# Patient Record
Sex: MALE | Race: WHITE | Employment: FULL TIME | URBAN - METROPOLITAN AREA
[De-identification: names, ages, dates, MRNs, and addresses within clinical notes are randomized per-mention and may not be internally consistent; named-entity substitution may affect disease eponyms.]

---

## 2017-06-08 NOTE — LETTER
12/14/22        [address] [place] [id number]      Dear Cecile Panda records indicate that you have outstanding lab work and or testing that was ordered for you and has not yet been completed:  Orders Placed This Encounter      Occult Blood, Fecal, Immunoassay (Blue cards) [E]    To provide you with the best possible care, please complete these orders at your earliest convenience. If you have recently completed these orders please disregard this letter. If you have any questions please call the office at Dept: [phone].      Thank you,       Hyacinth Gates PA-C Statement Selected

## 2020-04-12 ENCOUNTER — APPOINTMENT (OUTPATIENT)
Dept: CT IMAGING | Facility: HOSPITAL | Age: 55
End: 2020-04-12
Attending: EMERGENCY MEDICINE
Payer: COMMERCIAL

## 2020-04-12 ENCOUNTER — APPOINTMENT (OUTPATIENT)
Dept: ULTRASOUND IMAGING | Facility: HOSPITAL | Age: 55
End: 2020-04-12
Attending: EMERGENCY MEDICINE
Payer: COMMERCIAL

## 2020-04-12 ENCOUNTER — HOSPITAL ENCOUNTER (OUTPATIENT)
Facility: HOSPITAL | Age: 55
Setting detail: OBSERVATION
Discharge: HOME | End: 2020-04-16
Attending: EMERGENCY MEDICINE | Admitting: HOSPITALIST
Payer: COMMERCIAL

## 2020-04-12 DIAGNOSIS — R10.9 ABDOMINAL PAIN, ACUTE: Primary | ICD-10-CM

## 2020-04-12 DIAGNOSIS — K80.20 CALCULUS OF GALLBLADDER WITHOUT CHOLECYSTITIS WITHOUT OBSTRUCTION: ICD-10-CM

## 2020-04-12 DIAGNOSIS — R74.8 ELEVATED LIVER ENZYMES: ICD-10-CM

## 2020-04-12 PROCEDURE — 80048 BASIC METABOLIC PNL TOTAL CA: CPT | Performed by: EMERGENCY MEDICINE

## 2020-04-12 PROCEDURE — 99285 EMERGENCY DEPT VISIT HI MDM: CPT

## 2020-04-12 PROCEDURE — 96374 THER/PROPH/DIAG INJ IV PUSH: CPT

## 2020-04-12 PROCEDURE — 86256 FLUORESCENT ANTIBODY TITER: CPT | Performed by: INTERNAL MEDICINE

## 2020-04-12 PROCEDURE — 71275 CT ANGIOGRAPHY CHEST: CPT | Performed by: EMERGENCY MEDICINE

## 2020-04-12 PROCEDURE — 82728 ASSAY OF FERRITIN: CPT | Performed by: INTERNAL MEDICINE

## 2020-04-12 PROCEDURE — 74175 CTA ABDOMEN W/CONTRAST: CPT | Performed by: EMERGENCY MEDICINE

## 2020-04-12 PROCEDURE — 86255 FLUORESCENT ANTIBODY SCREEN: CPT | Performed by: INTERNAL MEDICINE

## 2020-04-12 PROCEDURE — 76705 ECHO EXAM OF ABDOMEN: CPT | Performed by: EMERGENCY MEDICINE

## 2020-04-12 PROCEDURE — 81003 URINALYSIS AUTO W/O SCOPE: CPT | Performed by: EMERGENCY MEDICINE

## 2020-04-12 PROCEDURE — 80074 ACUTE HEPATITIS PANEL: CPT | Performed by: INTERNAL MEDICINE

## 2020-04-12 PROCEDURE — 85025 COMPLETE CBC W/AUTO DIFF WBC: CPT | Performed by: EMERGENCY MEDICINE

## 2020-04-12 PROCEDURE — 96361 HYDRATE IV INFUSION ADD-ON: CPT

## 2020-04-12 PROCEDURE — 87040 BLOOD CULTURE FOR BACTERIA: CPT | Performed by: HOSPITALIST

## 2020-04-12 PROCEDURE — 74176 CT ABD & PELVIS W/O CONTRAST: CPT | Performed by: EMERGENCY MEDICINE

## 2020-04-12 PROCEDURE — 81001 URINALYSIS AUTO W/SCOPE: CPT | Performed by: EMERGENCY MEDICINE

## 2020-04-12 PROCEDURE — 83540 ASSAY OF IRON: CPT | Performed by: INTERNAL MEDICINE

## 2020-04-12 PROCEDURE — 86038 ANTINUCLEAR ANTIBODIES: CPT | Performed by: INTERNAL MEDICINE

## 2020-04-12 PROCEDURE — 84466 ASSAY OF TRANSFERRIN: CPT | Performed by: INTERNAL MEDICINE

## 2020-04-12 PROCEDURE — 80076 HEPATIC FUNCTION PANEL: CPT | Performed by: EMERGENCY MEDICINE

## 2020-04-12 PROCEDURE — 83690 ASSAY OF LIPASE: CPT | Performed by: HOSPITALIST

## 2020-04-12 PROCEDURE — 86140 C-REACTIVE PROTEIN: CPT | Performed by: HOSPITALIST

## 2020-04-12 PROCEDURE — 84145 PROCALCITONIN (PCT): CPT | Performed by: HOSPITALIST

## 2020-04-12 PROCEDURE — 96375 TX/PRO/DX INJ NEW DRUG ADDON: CPT

## 2020-04-12 RX ORDER — MORPHINE SULFATE 4 MG/ML
4 INJECTION, SOLUTION INTRAMUSCULAR; INTRAVENOUS ONCE
Status: COMPLETED | OUTPATIENT
Start: 2020-04-12 | End: 2020-04-12

## 2020-04-12 RX ORDER — TRAMADOL HYDROCHLORIDE 50 MG/1
50 TABLET ORAL EVERY 6 HOURS PRN
Status: DISCONTINUED | OUTPATIENT
Start: 2020-04-12 | End: 2020-04-16

## 2020-04-12 RX ORDER — ONDANSETRON 2 MG/ML
4 INJECTION INTRAMUSCULAR; INTRAVENOUS ONCE
Status: COMPLETED | OUTPATIENT
Start: 2020-04-12 | End: 2020-04-12

## 2020-04-12 RX ORDER — ONDANSETRON 2 MG/ML
4 INJECTION INTRAMUSCULAR; INTRAVENOUS EVERY 6 HOURS PRN
Status: DISCONTINUED | OUTPATIENT
Start: 2020-04-12 | End: 2020-04-16

## 2020-04-12 RX ORDER — SODIUM CHLORIDE 9 MG/ML
INJECTION, SOLUTION INTRAVENOUS CONTINUOUS
Status: DISCONTINUED | OUTPATIENT
Start: 2020-04-12 | End: 2020-04-12

## 2020-04-12 RX ORDER — ONDANSETRON 2 MG/ML
INJECTION INTRAMUSCULAR; INTRAVENOUS
Status: COMPLETED
Start: 2020-04-12 | End: 2020-04-12

## 2020-04-12 RX ORDER — KETOROLAC TROMETHAMINE 15 MG/ML
15 INJECTION, SOLUTION INTRAMUSCULAR; INTRAVENOUS ONCE
Status: COMPLETED | OUTPATIENT
Start: 2020-04-12 | End: 2020-04-12

## 2020-04-12 RX ORDER — ACETAMINOPHEN 325 MG/1
650 TABLET ORAL EVERY 6 HOURS PRN
Status: DISCONTINUED | OUTPATIENT
Start: 2020-04-12 | End: 2020-04-16

## 2020-04-12 RX ORDER — SODIUM CHLORIDE 9 MG/ML
INJECTION, SOLUTION INTRAVENOUS CONTINUOUS
Status: DISCONTINUED | OUTPATIENT
Start: 2020-04-12 | End: 2020-04-16

## 2020-04-12 RX ORDER — FAMOTIDINE 20 MG/1
20 TABLET ORAL 2 TIMES DAILY
Status: DISCONTINUED | OUTPATIENT
Start: 2020-04-12 | End: 2020-04-16

## 2020-04-12 RX ORDER — SODIUM CHLORIDE 0.9 % (FLUSH) 0.9 %
3 SYRINGE (ML) INJECTION AS NEEDED
Status: DISCONTINUED | OUTPATIENT
Start: 2020-04-12 | End: 2020-04-16

## 2020-04-12 RX ORDER — KETOROLAC TROMETHAMINE 30 MG/ML
30 INJECTION, SOLUTION INTRAMUSCULAR; INTRAVENOUS EVERY 6 HOURS PRN
Status: DISCONTINUED | OUTPATIENT
Start: 2020-04-12 | End: 2020-04-14

## 2020-04-12 NOTE — PROGRESS NOTES
[facility] Pharmacy Note: Antimicrobial Weight Based Dose Adjustment for: piperacillin/tazobactam (Penny Marcelo)    Rosa Brantley is a 62year old male who has been prescribed piperacillin/tazobactam (ZOSYN) 3.375 gm every 8 hours.     Estimated Creatinine Clearance: 95.3

## 2020-04-12 NOTE — CONSULTS
[facility]    Report of Consultation    Tricia Fletcher Patient Status:  Emergency    1965 MRN [id number]   Location [address] Attending Rowan Bay MD   Hosp Day # 0 PCP Reginaldo Bowers MD     Da Oral, resp. rate 20, height 6' (1.829 m), weight 255 lb (115.7 kg), SpO2 97 %.     GENERAL: well developed, well nourished, in no apparent distress  SKIN: no rashes, no suspicious lesions  HEENT: atraumatic, normocephalic  CHEST/CARDIO: RRR without murmur Dictated by (CST): Lorena Reyes MD on 4/12/2020 at 9:59 AM     Finalized by (CST): Lorena Reyes MD on 4/12/2020 at 10:06 AM               Impression:   62year old male with Obesity who presents with acute onset of abdominal pain.     Abdominal Pa

## 2020-04-12 NOTE — PROGRESS NOTES
Admitted from ED. Settled into bed, educated on call light use. IVF, IV zosyn initiated. Temperature 103 degrees upon arrival. Denied chills. Dr. Leeann Hill MD notified at bedside. Swabbed for COVID testing. Tylenol given and ice packs given.  Rechecked temp--> 1

## 2020-04-12 NOTE — H&P
[facility] Hospitalist H&P       CC: Patient presents with:  Abdomen/Flank Pain       PCP: Trish Castillo MD    History of Present Illness: Patient is a 62year old male with no PMH who presents with abd pain.   Patient states this morning a 4 am he woke up Diagnostic Data:    CBC/Chem  Recent Labs   Lab 04/12/20  0831   WBC 9.0   HGB 17.1   MCV 94.0   .0       Recent Labs   Lab 04/12/20  0831      K 3.9      CO2 27.0   BUN 14   CREATSERUM 0.95   *   CA 9.3       Recent Labs   La negative  - GI consulted, discussed with Dr. Newberry Revebk  - plan for MRCP  - given fever (103 while on floor), will start zosyn    Fever  - CT without obvious infectious etiology, poss cholangitis?  But no dilated ducts  - check BC and UA  - rapid COVID ordered,

## 2020-04-12 NOTE — ED PROVIDER NOTES
Patient Seen in: [facility] AND [facility] Emergency Department      History   Patient presents with:  Abdomen/Flank Pain    Stated Complaint: ABDOMINAL PAIN    HPI    The patient is a 78-year-old male who presents with sudden onset of sharp abdominal pain that regular rhythm. Pulses: Normal pulses. Heart sounds: Normal heart sounds. No murmur. Pulmonary:      Effort: Pulmonary effort is normal.      Breath sounds: Normal breath sounds.    Abdominal:      General: Bowel sounds are normal. There is no d CBC W/ DIFFERENTIAL[[id number]]                              Final result                 Please view results for these tests on the individual orders.    RAINBOW DRAW BLUE   RAINBOW DRAW LAVENDER   RAINBOW DRAW LIGHT GREEN   RAINBOW DRAW GOLD   CBC W/ DIF 4/12/2020 at 9:59 AM     Finalized by (CST): Lorena Reyes MD on 4/12/2020 at 10:06 AM            Radiology exams  Viewed and reviewed by myself and findings discussed with patient including need for follow up    Admission disposition: 4/12/2020 12:16

## 2020-04-12 NOTE — ED NOTES
Patient here with severe abd pain x4 hours. Patient has pain to the RLQ and across his mid abd. Mid abd firm to touch, slightly radiates to back. Episode of nausea in room, no vomiting. Placed on monitors, will continue to assess.

## 2020-04-13 ENCOUNTER — APPOINTMENT (OUTPATIENT)
Dept: MRI IMAGING | Facility: HOSPITAL | Age: 55
End: 2020-04-13
Attending: HOSPITALIST
Payer: COMMERCIAL

## 2020-04-13 PROCEDURE — 85610 PROTHROMBIN TIME: CPT | Performed by: HOSPITALIST

## 2020-04-13 PROCEDURE — 76376 3D RENDER W/INTRP POSTPROCES: CPT | Performed by: HOSPITALIST

## 2020-04-13 PROCEDURE — 74181 MRI ABDOMEN W/O CONTRAST: CPT | Performed by: HOSPITALIST

## 2020-04-13 PROCEDURE — 82728 ASSAY OF FERRITIN: CPT | Performed by: HOSPITALIST

## 2020-04-13 PROCEDURE — 83735 ASSAY OF MAGNESIUM: CPT | Performed by: HOSPITALIST

## 2020-04-13 PROCEDURE — 80053 COMPREHEN METABOLIC PANEL: CPT | Performed by: HOSPITALIST

## 2020-04-13 PROCEDURE — 85025 COMPLETE CBC W/AUTO DIFF WBC: CPT | Performed by: HOSPITALIST

## 2020-04-13 NOTE — PLAN OF CARE
Problem: Patient Centered Care  Goal: Patient preferences are identified and integrated in the patient's plan of care  Description  Interventions:  - What would you like us to know as we care for you?   - Provide timely, complete, and accurate informatio profile  Outcome: Progressing     Problem: PAIN - ADULT  Goal: Verbalizes/displays adequate comfort level or patient's stated pain goal  Description  INTERVENTIONS:  - Encourage pt to monitor pain and request assistance  - Assess pain using appropriate polo POLST form as appropriate  - Assess patient's ability to be responsible for managing their own health  - Refer to Case Management Department for coordinating discharge planning if the patient needs post-hospital services based on physician/LIP order or com

## 2020-04-13 NOTE — PLAN OF CARE
Discussed plan of care for day, including all given medications and their indications. Patient rule-out Covid-19 since admission yesterday evening. Rapid screen and PCR screen both negative. NPO at present.   Plan for MRCP tonight -- based on results, wi intermittent suction as ordered  - Evaluate effectiveness of ordered antiemetic medications  - Provide nonpharmacologic comfort measures as appropriate  - Advance diet as tolerated, if ordered  - Obtain nutritional consult as needed  - Evaluate fluid qing Provide assistive devices as appropriate  - Consider OT/PT consult to assist with strengthening/mobility  - Encourage toileting schedule  Outcome: Progressing     Problem: DISCHARGE PLANNING  Goal: Discharge to home or other facility with appropriate resou

## 2020-04-13 NOTE — PROGRESS NOTES
Patient spiked fever to 103 during afternoon of admission. Zosyn empirically starterd. MRCP recommended and is pending. Rapid Covid-19 negative. LFTs worsening with Tbili now up to 4.3 this AM.  Need MRCP and possible ERCP today.  Will discuss with

## 2020-04-13 NOTE — PROGRESS NOTES
[facility] Hospitalist Progress Note     PCP: Kylie Hampton MD    CC: Follow up       Assessment/Plan:     Patient is a 62year old male with no PMH who presents with abd pain.      Abd pain / transaminitis   - severe abd pain on admit, LFT's elevated  - C kg)  04/19/16 1523 : 235 lb (106.6 kg)      Exam  Gen: No acute distress, alert and oriented x3  Neck Supple, no JVD  Pulm: Lungs clear, normal respiratory effort, No wheezing or crackles  CV: Heart with regular rate and rhythm, No murmurs, rubs, gallops hernia which also contains small portion of the descending distal colon. No evidence of bowel obstruction. Hepatic steatosis. Cholelithiasis. Colonic diverticulosis. Prominent prostate.     Dictated by (CST): Dennise Salmeron MD on 4/12/2020 at 9:24 A

## 2020-04-13 NOTE — CONSULTS
[address] [facility] [address]  GIS:[id number]  LOS:0    Date of Admission:  4/12/2020  Date of Consult:  4/13/2020     Blair Bennett MBP/ADD-vantage, 4.5 g, Intravenous, Q8H  •  famoTIDine (PEPCID) tab 20 mg, 20 mg, Oral, BID    Review of Systems:   Pertinent items are noted in HPI.       Physical Exam:    04/12/20  1908 04/12/20  2023 04/12/20  2259 04/13/20  0628   BP:  124/87  134/90 examination. Small fat containing left inguinal hernia which also contains small portion of the descending distal colon. No evidence of bowel obstruction. Hepatic steatosis. Cholelithiasis. Colonic diverticulosis. Prominent prostate.     Dictated by (

## 2020-04-14 ENCOUNTER — APPOINTMENT (OUTPATIENT)
Dept: ULTRASOUND IMAGING | Facility: HOSPITAL | Age: 55
End: 2020-04-14
Attending: HOSPITALIST
Payer: COMMERCIAL

## 2020-04-14 PROCEDURE — 83690 ASSAY OF LIPASE: CPT | Performed by: SURGERY

## 2020-04-14 PROCEDURE — 85025 COMPLETE CBC W/AUTO DIFF WBC: CPT | Performed by: SURGERY

## 2020-04-14 PROCEDURE — 85610 PROTHROMBIN TIME: CPT | Performed by: SURGERY

## 2020-04-14 PROCEDURE — 93010 ELECTROCARDIOGRAM REPORT: CPT | Performed by: HOSPITALIST

## 2020-04-14 PROCEDURE — 85730 THROMBOPLASTIN TIME PARTIAL: CPT | Performed by: SURGERY

## 2020-04-14 PROCEDURE — 84100 ASSAY OF PHOSPHORUS: CPT | Performed by: HOSPITALIST

## 2020-04-14 PROCEDURE — 85379 FIBRIN DEGRADATION QUANT: CPT | Performed by: SURGERY

## 2020-04-14 PROCEDURE — 83735 ASSAY OF MAGNESIUM: CPT | Performed by: SURGERY

## 2020-04-14 PROCEDURE — 93970 EXTREMITY STUDY: CPT | Performed by: HOSPITALIST

## 2020-04-14 PROCEDURE — 84100 ASSAY OF PHOSPHORUS: CPT | Performed by: SURGERY

## 2020-04-14 PROCEDURE — 93005 ELECTROCARDIOGRAM TRACING: CPT

## 2020-04-14 PROCEDURE — 80048 BASIC METABOLIC PNL TOTAL CA: CPT | Performed by: SURGERY

## 2020-04-14 PROCEDURE — 80076 HEPATIC FUNCTION PANEL: CPT | Performed by: SURGERY

## 2020-04-14 RX ORDER — ENOXAPARIN SODIUM 100 MG/ML
40 INJECTION SUBCUTANEOUS ONCE
Status: COMPLETED | OUTPATIENT
Start: 2020-04-14 | End: 2020-04-14

## 2020-04-14 NOTE — PLAN OF CARE
Problem: Patient Centered Care  Goal: Patient preferences are identified and integrated in the patient's plan of care  Description  Interventions:  - What would you like us to know as we care for you?   I work for a [address] [place], [place] [address]melissa Watson for [address] ordered and tolerated  - Evaluate effectiveness of GI medications  - Encourage mobilization and activity  - Obtain nutritional consult as needed  - Establish a toileting routine/schedule  - Consider collaborating with pharmacy to review patient's medicatio discharge resources and transportation as appropriate  - Identify discharge learning needs (meds, wound care, etc)  - Arrange for interpreters to assist at discharge as needed  - Consider post-discharge preferences of patient/family/discharge partner  - Co

## 2020-04-14 NOTE — PROGRESS NOTES
[facility]    General Surgery Progress Note  Samantha Gilliam  TYP:[id number]  HD# [id number]       Subjective:   Denies complaints.  denies abdominal pain, nausea and vomiting  Passing flatus      Exam:     General: awake and alert, in no acute distress, 4/13/2020  CONCLUSION:  1. The pancreaticobiliary tree demonstrates normal caliber and morphology without identification of intraductal filling defects. 2. Cholelithiasis without MR evidence of acute cholecystitis or choledocholithiasis.   3. There are a f

## 2020-04-14 NOTE — PLAN OF CARE
Pt is currently resting. C/o headache earlier, gave tylenol PRN. Voiding within normal limits. NPO at midnight maintained. Isolation precautions maintained. IVF infusing. Safety plan in place. Plan is ongoing.      Problem: Patient Centered Care  Goal: Ysabel needed  - Evaluate fluid balance  Outcome: Progressing  Goal: Maintains or returns to baseline bowel function  Description  INTERVENTIONS:  - Assess bowel function  - Maintain adequate hydration with IV or PO as ordered and tolerated  - Evaluate effectiven facility with appropriate resources  Description  INTERVENTIONS:  - Identify barriers to discharge w/pt and caregiver  - Include patient/family/discharge partner in discharge planning  - Arrange for needed discharge resources and transportation as appropri

## 2020-04-14 NOTE — PROGRESS NOTES
S-  made pre-op visit. Patient alert in room  O- patient expressed feeling frustrated at set-backs in procedure. Patient    is anxious to return home and return to work. Patient expressed having   anxieties about finances.  Patient is trying to take

## 2020-04-14 NOTE — PROGRESS NOTES
[facility] Hospitalist Progress Note     PCP: Trish Castillo MD    CC: Follow up       Assessment/Plan:     Patient is a 62year old male with no PMH who presents with abd pain. MRCP with cholelithiasis and no retained stones.   Few cystic foci in pancreas t recommendations pending patient's clinical course.   [facility] hospitalist to continue to follow patient while in house     Patient and/or patient's family given opportunity to ask questions and note understanding and agreeing with therapeutic plan as outlined    INR  --  1.39*  --  1.23*       Recent Labs   Lab 04/12/20  0831 04/13/20  0506 04/14/20  0513    140 141   K 3.9 3.5 4.4    110 113*   CO2 27.0 23.0 25.0   BUN 14 10 5*   CREATSERUM 0.95 0.90 0.86   CA 9.3 7.8* 8.3*   MG  --  2.2 2.5   PHOS colon. No evidence of bowel obstruction. Hepatic steatosis. Cholelithiasis. Colonic diverticulosis. Prominent prostate.     Dictated by (CST): Kasandra Ross MD on 4/12/2020 at 9:24 AM     Finalized by (CST): Kasandra Ross MD on 4/12/2020 at 9:3

## 2020-04-14 NOTE — PROGRESS NOTES
[facility] Gastroenterology Progress Note    Miryam Gavin  [id number]  4/21/1965    No ref.  provider found    24 hour events   -MRCP without any choledocholithiasis   -denies any abdominal pain     MEDICAL HISTORY:     History revi 126 (H) 04/14/2020 05:13 AM    ANIONGAP 3 04/14/2020 05:13 AM      Lab Results   Component Value Date/Time    MG 2.5 04/14/2020 05:13 AM    PHOS 1.4 (L) 04/14/2020 05:13 AM     Lab Results   Component Value Date/Time     (H) 04/14/2020 05:13 AM    A CONCLUSION: Normal examination.      Dictated by (CST): Raquel Harris MD on 4/14/2020 at 1:19 PM     Finalized by (CST): Raquel Harris MD on 4/14/2020 at 1:21 PM          Mri Mrcp W/3d Only (cpt=76376/64566)    Result Date: 4/13/2020  PROCEDURE: MRI M within the parameters of this exam. There is otherwise preservation of normal  signal intensity on precontrast T1-weighted, fat-saturated images. No focal solid-appearing mass or main pancreatic duct dilatation is seen.  There is no evidence of pancreatitis COMPARISON: None. INDICATIONS: Pt presents to Ed with acute abdominal RLQ pain. onset 4 hr ago  TECHNIQUE: Axial images of the abdomen and pelvis were obtained without intravenous contrast.  Multiplanar reformations obtained.   Dose reduction techniques uti at 9:30 AM          Cta Chest+cta Abdomen Dissect Set (ZJT=53036/41020)    Result Date: 4/12/2020  PROCEDURE: CT CHEST ABDOMEN DISSECT SET (CPT=71275/73409)  COMPARISON: [facility], CT ABDOMEN + PELVIS KIDNEYSTONE 2D RNDR (NO IV NO ORAL) (C dissection. Lesser incidental findings as above.     Dictated by (CST): Delaney Sanches MD on 4/12/2020 at 9:59 AM     Finalized by (CST): Delaney Sanches MD on 4/12/2020 at 10:06 AM               ASSESSMENT AND PLAN:   60yo M who presents with abdomin

## 2020-04-15 ENCOUNTER — APPOINTMENT (OUTPATIENT)
Dept: GENERAL RADIOLOGY | Facility: HOSPITAL | Age: 55
End: 2020-04-15
Attending: SURGERY
Payer: COMMERCIAL

## 2020-04-15 ENCOUNTER — ANESTHESIA (OUTPATIENT)
Dept: SURGERY | Facility: HOSPITAL | Age: 55
End: 2020-04-15
Payer: COMMERCIAL

## 2020-04-15 ENCOUNTER — ANESTHESIA EVENT (OUTPATIENT)
Dept: SURGERY | Facility: HOSPITAL | Age: 55
End: 2020-04-15
Payer: COMMERCIAL

## 2020-04-15 PROCEDURE — 83735 ASSAY OF MAGNESIUM: CPT | Performed by: SURGERY

## 2020-04-15 PROCEDURE — 87186 SC STD MICRODIL/AGAR DIL: CPT | Performed by: SURGERY

## 2020-04-15 PROCEDURE — 85025 COMPLETE CBC W/AUTO DIFF WBC: CPT | Performed by: SURGERY

## 2020-04-15 PROCEDURE — 84100 ASSAY OF PHOSPHORUS: CPT | Performed by: SURGERY

## 2020-04-15 PROCEDURE — 80076 HEPATIC FUNCTION PANEL: CPT | Performed by: SURGERY

## 2020-04-15 PROCEDURE — 87077 CULTURE AEROBIC IDENTIFY: CPT | Performed by: SURGERY

## 2020-04-15 PROCEDURE — 87205 SMEAR GRAM STAIN: CPT | Performed by: SURGERY

## 2020-04-15 PROCEDURE — 0FT44ZZ RESECTION OF GALLBLADDER, PERCUTANEOUS ENDOSCOPIC APPROACH: ICD-10-PCS | Performed by: SURGERY

## 2020-04-15 PROCEDURE — BF121ZZ FLUOROSCOPY OF GALLBLADDER USING LOW OSMOLAR CONTRAST: ICD-10-PCS | Performed by: SURGERY

## 2020-04-15 PROCEDURE — 87070 CULTURE OTHR SPECIMN AEROBIC: CPT | Performed by: SURGERY

## 2020-04-15 PROCEDURE — 85730 THROMBOPLASTIN TIME PARTIAL: CPT | Performed by: SURGERY

## 2020-04-15 PROCEDURE — 85610 PROTHROMBIN TIME: CPT | Performed by: SURGERY

## 2020-04-15 PROCEDURE — 88304 TISSUE EXAM BY PATHOLOGIST: CPT | Performed by: SURGERY

## 2020-04-15 PROCEDURE — 80048 BASIC METABOLIC PNL TOTAL CA: CPT | Performed by: SURGERY

## 2020-04-15 PROCEDURE — 74300 X-RAY BILE DUCTS/PANCREAS: CPT | Performed by: SURGERY

## 2020-04-15 RX ORDER — HYDROMORPHONE HYDROCHLORIDE 1 MG/ML
0.6 INJECTION, SOLUTION INTRAMUSCULAR; INTRAVENOUS; SUBCUTANEOUS EVERY 5 MIN PRN
Status: DISCONTINUED | OUTPATIENT
Start: 2020-04-15 | End: 2020-04-15 | Stop reason: HOSPADM

## 2020-04-15 RX ORDER — POTASSIUM CHLORIDE 20 MEQ/1
40 TABLET, EXTENDED RELEASE ORAL ONCE
Status: DISCONTINUED | OUTPATIENT
Start: 2020-04-15 | End: 2020-04-16

## 2020-04-15 RX ORDER — DEXAMETHASONE SODIUM PHOSPHATE 4 MG/ML
VIAL (ML) INJECTION AS NEEDED
Status: DISCONTINUED | OUTPATIENT
Start: 2020-04-15 | End: 2020-04-15 | Stop reason: SURG

## 2020-04-15 RX ORDER — HYDROCODONE BITARTRATE AND ACETAMINOPHEN 5; 325 MG/1; MG/1
2 TABLET ORAL EVERY 4 HOURS PRN
Status: DISCONTINUED | OUTPATIENT
Start: 2020-04-15 | End: 2020-04-16

## 2020-04-15 RX ORDER — PROCHLORPERAZINE EDISYLATE 5 MG/ML
5 INJECTION INTRAMUSCULAR; INTRAVENOUS ONCE AS NEEDED
Status: DISCONTINUED | OUTPATIENT
Start: 2020-04-15 | End: 2020-04-15 | Stop reason: HOSPADM

## 2020-04-15 RX ORDER — HYDROMORPHONE HYDROCHLORIDE 1 MG/ML
0.4 INJECTION, SOLUTION INTRAMUSCULAR; INTRAVENOUS; SUBCUTANEOUS EVERY 2 HOUR PRN
Status: DISCONTINUED | OUTPATIENT
Start: 2020-04-15 | End: 2020-04-16

## 2020-04-15 RX ORDER — MORPHINE SULFATE 10 MG/ML
6 INJECTION, SOLUTION INTRAMUSCULAR; INTRAVENOUS EVERY 10 MIN PRN
Status: DISCONTINUED | OUTPATIENT
Start: 2020-04-15 | End: 2020-04-15 | Stop reason: HOSPADM

## 2020-04-15 RX ORDER — ONDANSETRON 2 MG/ML
INJECTION INTRAMUSCULAR; INTRAVENOUS AS NEEDED
Status: DISCONTINUED | OUTPATIENT
Start: 2020-04-15 | End: 2020-04-15 | Stop reason: SURG

## 2020-04-15 RX ORDER — ROCURONIUM BROMIDE 10 MG/ML
INJECTION, SOLUTION INTRAVENOUS AS NEEDED
Status: DISCONTINUED | OUTPATIENT
Start: 2020-04-15 | End: 2020-04-15 | Stop reason: SURG

## 2020-04-15 RX ORDER — BUPIVACAINE HYDROCHLORIDE AND EPINEPHRINE 2.5; 5 MG/ML; UG/ML
INJECTION, SOLUTION INFILTRATION; PERINEURAL AS NEEDED
Status: DISCONTINUED | OUTPATIENT
Start: 2020-04-15 | End: 2020-04-15 | Stop reason: HOSPADM

## 2020-04-15 RX ORDER — HALOPERIDOL 5 MG/ML
0.25 INJECTION INTRAMUSCULAR ONCE AS NEEDED
Status: DISCONTINUED | OUTPATIENT
Start: 2020-04-15 | End: 2020-04-15 | Stop reason: HOSPADM

## 2020-04-15 RX ORDER — ENOXAPARIN SODIUM 100 MG/ML
40 INJECTION SUBCUTANEOUS DAILY
Status: DISCONTINUED | OUTPATIENT
Start: 2020-04-15 | End: 2020-04-16

## 2020-04-15 RX ORDER — HYDROMORPHONE HYDROCHLORIDE 1 MG/ML
0.2 INJECTION, SOLUTION INTRAMUSCULAR; INTRAVENOUS; SUBCUTANEOUS EVERY 5 MIN PRN
Status: DISCONTINUED | OUTPATIENT
Start: 2020-04-15 | End: 2020-04-15 | Stop reason: HOSPADM

## 2020-04-15 RX ORDER — HYDROMORPHONE HYDROCHLORIDE 1 MG/ML
0.4 INJECTION, SOLUTION INTRAMUSCULAR; INTRAVENOUS; SUBCUTANEOUS EVERY 5 MIN PRN
Status: DISCONTINUED | OUTPATIENT
Start: 2020-04-15 | End: 2020-04-15 | Stop reason: HOSPADM

## 2020-04-15 RX ORDER — HYDROMORPHONE HYDROCHLORIDE 1 MG/ML
0.8 INJECTION, SOLUTION INTRAMUSCULAR; INTRAVENOUS; SUBCUTANEOUS EVERY 2 HOUR PRN
Status: DISCONTINUED | OUTPATIENT
Start: 2020-04-15 | End: 2020-04-16

## 2020-04-15 RX ORDER — HYDROCODONE BITARTRATE AND ACETAMINOPHEN 5; 325 MG/1; MG/1
1 TABLET ORAL AS NEEDED
Status: DISCONTINUED | OUTPATIENT
Start: 2020-04-15 | End: 2020-04-15 | Stop reason: HOSPADM

## 2020-04-15 RX ORDER — ONDANSETRON 2 MG/ML
4 INJECTION INTRAMUSCULAR; INTRAVENOUS ONCE AS NEEDED
Status: DISCONTINUED | OUTPATIENT
Start: 2020-04-15 | End: 2020-04-15 | Stop reason: HOSPADM

## 2020-04-15 RX ORDER — HEPARIN SODIUM 1000 [USP'U]/ML
INJECTION, SOLUTION INTRAVENOUS; SUBCUTANEOUS AS NEEDED
Status: DISCONTINUED | OUTPATIENT
Start: 2020-04-15 | End: 2020-04-15 | Stop reason: HOSPADM

## 2020-04-15 RX ORDER — NALOXONE HYDROCHLORIDE 0.4 MG/ML
80 INJECTION, SOLUTION INTRAMUSCULAR; INTRAVENOUS; SUBCUTANEOUS AS NEEDED
Status: DISCONTINUED | OUTPATIENT
Start: 2020-04-15 | End: 2020-04-15 | Stop reason: HOSPADM

## 2020-04-15 RX ORDER — HYDROCODONE BITARTRATE AND ACETAMINOPHEN 5; 325 MG/1; MG/1
2 TABLET ORAL AS NEEDED
Status: DISCONTINUED | OUTPATIENT
Start: 2020-04-15 | End: 2020-04-15 | Stop reason: HOSPADM

## 2020-04-15 RX ORDER — HYDROMORPHONE HYDROCHLORIDE 1 MG/ML
1.2 INJECTION, SOLUTION INTRAMUSCULAR; INTRAVENOUS; SUBCUTANEOUS EVERY 2 HOUR PRN
Status: DISCONTINUED | OUTPATIENT
Start: 2020-04-15 | End: 2020-04-15

## 2020-04-15 RX ORDER — HYDROCODONE BITARTRATE AND ACETAMINOPHEN 5; 325 MG/1; MG/1
1 TABLET ORAL EVERY 4 HOURS PRN
Status: DISCONTINUED | OUTPATIENT
Start: 2020-04-15 | End: 2020-04-16

## 2020-04-15 RX ORDER — LIDOCAINE HYDROCHLORIDE 10 MG/ML
INJECTION, SOLUTION EPIDURAL; INFILTRATION; INTRACAUDAL; PERINEURAL AS NEEDED
Status: DISCONTINUED | OUTPATIENT
Start: 2020-04-15 | End: 2020-04-15 | Stop reason: SURG

## 2020-04-15 RX ORDER — MORPHINE SULFATE 4 MG/ML
4 INJECTION, SOLUTION INTRAMUSCULAR; INTRAVENOUS EVERY 10 MIN PRN
Status: DISCONTINUED | OUTPATIENT
Start: 2020-04-15 | End: 2020-04-15 | Stop reason: HOSPADM

## 2020-04-15 RX ORDER — SODIUM CHLORIDE, SODIUM LACTATE, POTASSIUM CHLORIDE, CALCIUM CHLORIDE 600; 310; 30; 20 MG/100ML; MG/100ML; MG/100ML; MG/100ML
INJECTION, SOLUTION INTRAVENOUS CONTINUOUS
Status: DISCONTINUED | OUTPATIENT
Start: 2020-04-15 | End: 2020-04-15 | Stop reason: HOSPADM

## 2020-04-15 RX ORDER — MORPHINE SULFATE 4 MG/ML
2 INJECTION, SOLUTION INTRAMUSCULAR; INTRAVENOUS EVERY 10 MIN PRN
Status: DISCONTINUED | OUTPATIENT
Start: 2020-04-15 | End: 2020-04-15 | Stop reason: HOSPADM

## 2020-04-15 RX ORDER — SODIUM CHLORIDE, SODIUM LACTATE, POTASSIUM CHLORIDE, CALCIUM CHLORIDE 600; 310; 30; 20 MG/100ML; MG/100ML; MG/100ML; MG/100ML
INJECTION, SOLUTION INTRAVENOUS CONTINUOUS PRN
Status: DISCONTINUED | OUTPATIENT
Start: 2020-04-15 | End: 2020-04-15 | Stop reason: SURG

## 2020-04-15 RX ADMIN — DEXAMETHASONE SODIUM PHOSPHATE 4 MG: 4 MG/ML VIAL (ML) INJECTION at 11:16:00

## 2020-04-15 RX ADMIN — ONDANSETRON 4 MG: 2 INJECTION INTRAMUSCULAR; INTRAVENOUS at 11:16:00

## 2020-04-15 RX ADMIN — ROCURONIUM BROMIDE 10 MG: 10 INJECTION, SOLUTION INTRAVENOUS at 11:16:00

## 2020-04-15 RX ADMIN — SODIUM CHLORIDE, SODIUM LACTATE, POTASSIUM CHLORIDE, CALCIUM CHLORIDE: 600; 310; 30; 20 INJECTION, SOLUTION INTRAVENOUS at 11:12:00

## 2020-04-15 RX ADMIN — ROCURONIUM BROMIDE 40 MG: 10 INJECTION, SOLUTION INTRAVENOUS at 11:25:00

## 2020-04-15 RX ADMIN — ROCURONIUM BROMIDE 20 MG: 10 INJECTION, SOLUTION INTRAVENOUS at 11:54:00

## 2020-04-15 RX ADMIN — SODIUM CHLORIDE, SODIUM LACTATE, POTASSIUM CHLORIDE, CALCIUM CHLORIDE: 600; 310; 30; 20 INJECTION, SOLUTION INTRAVENOUS at 12:53:00

## 2020-04-15 RX ADMIN — LIDOCAINE HYDROCHLORIDE 50 MG: 10 INJECTION, SOLUTION EPIDURAL; INFILTRATION; INTRACAUDAL; PERINEURAL at 11:16:00

## 2020-04-15 NOTE — ANESTHESIA PREPROCEDURE EVALUATION
Anesthesia PreOp Note    HPI:     Darleen Paez is a 62year old male who presents for preoperative consultation requested by: Marissa Carlson MD    Date of Surgery: 4/12/2020 - 4/15/2020    Procedure(s):  LAPAROSCOPIC CHOLECYSTECTOMY  Indication: cholec Relation Age of Onset   • Gastro-Intestinal Disorder Father      Social History    Socioeconomic History      Marital status:       Spouse name: Not on file      Number of children: Not on file      Years of education: Not on file      Highest educ 04/15/2020    BUN 4 (L) 04/15/2020    CREATSERUM 0.88 04/15/2020     (H) 04/15/2020    CA 8.3 (L) 04/15/2020     Lab Results   Component Value Date    INR 1.02 04/15/2020       Vital Signs: Body mass index is 34.58 kg/m².    height is 1.829 m (6') a risks including possible dental damage if relevant, major complications, and any alternative forms of anesthetic management. All of the patient's questions were answered to the best of my ability. The patient desires the anesthetic management as planned.

## 2020-04-15 NOTE — PROGRESS NOTES
[facility] Hospitalist Progress Note     PCP: Erica Childs MD    CC: Follow up       Assessment/Plan:     Patient is a 62year old male with no PMH who presents with abd pain. MRCP with cholelithiasis and no retained stones.   Few cystic foci in pancreas t recommendations pending patient's clinical course.   [facility] hospitalist to continue to follow patient while in house     Patient and/or patient's family given opportunity to ask questions and note understanding and agreeing with therapeutic plan as outlined    MCV 94.0 93.5 94.5  --  92.9   .0 146.0* 146.0*  --  158.0   INR  --  1.39*  --  1.23* 1.02       Recent Labs   Lab 04/12/20  0831 04/13/20  0506 04/14/20  0513 04/14/20  1920 04/15/20  0520    140 141  --  141   K 3.9 3.5 4.4  --  3.7   CL dilated side branch radicles, side branch type intraductal papillary mucinous neoplasms, or, in the setting of prior pancreatitis, tiny pseudocysts. A follow-up MRCP study is suggested in 12 months. 4. Hepatomegaly with underlying hepatic steatosis. 5.  L

## 2020-04-15 NOTE — PLAN OF CARE
Pt is currently resting. Denies pain. Zosyn given. IVF infusing. Voiding within normal limits. NPO since midnight. Plan for surgery tomorrow at 11 am. Consent signed. Safety plan in place.      Problem: Patient Centered Care  Goal: Patient preferences are and tolerated  - Nasogastric tube to low intermittent suction as ordered  - Evaluate effectiveness of ordered antiemetic medications  - Provide nonpharmacologic comfort measures as appropriate  - Advance diet as tolerated, if ordered  - Obtain nutritional needs  - Identify cognitive and physical deficits and behaviors that affect risk of falls.   - [place] fall precautions as indicated by assessment.  - Educate pt/family on patient safety including physical limitations  - Instruct pt to call for assistance

## 2020-04-15 NOTE — ANESTHESIA PROCEDURE NOTES
Airway  Date/Time: 4/15/2020 11:27 AM  Urgency: Elective    Airway not difficult    General Information and Staff    Patient location during procedure: OR  Anesthesiologist: Sharri Pryor MD  Resident/CRNA: Ilda Christianson CRNA  Performed: CRNA     Indic

## 2020-04-15 NOTE — OPERATIVE REPORT
OPERATIVE REPORT:     PATIENT NAME: Chance Arizmendi  : 1965    CSN: [id number]     DATE OF OPERATION:   04/15/20    PREOPERATIVE DIAGNOSIS: Acute on Chronic Cholecystitis, Cholelithiasis    POSTOPERATIVE DIAGNOSIS: Acute on Chronic Cholecystitis, Chol advanced through the ampulla. Contrast flowed freely. The cholangiogram catheter was then removed and the cystic duct was clipped 3 times below the ductotomy and transected.   The cystic artery was clipped and transected and the gallbladder was removed from

## 2020-04-15 NOTE — PLAN OF CARE
Problem: Patient Centered Care  Goal: Patient preferences are identified and integrated in the patient's plan of care  Description  Interventions:  - What would you like us to know as we care for you?   I work for a [address] [place], [place] [address]melissa Watson for [address] ordered and tolerated  - Evaluate effectiveness of GI medications  - Encourage mobilization and activity  - Obtain nutritional consult as needed  - Establish a toileting routine/schedule  - Consider collaborating with pharmacy to review patient's medicatio discharge resources and transportation as appropriate  - Identify discharge learning needs (meds, wound care, etc)  - Arrange for interpreters to assist at discharge as needed  - Consider post-discharge preferences of patient/family/discharge partner  - Co

## 2020-04-15 NOTE — ANESTHESIA POSTPROCEDURE EVALUATION
Patient: Tricia Fletcher    Procedure Summary     Date:  04/15/20 Room / Location:  [facility] MAIN OR  / [facility] MAIN OR    Anesthesia Start:  1349 Anesthesia Stop:  6228    Procedure:  LAPAROSCOPIC CHOLECYSTECTOMY (N/A Abdomen) Diagnosis:  (cholecystitis, cholelithi

## 2020-04-16 VITALS
DIASTOLIC BLOOD PRESSURE: 87 MMHG | OXYGEN SATURATION: 99 % | HEART RATE: 66 BPM | SYSTOLIC BLOOD PRESSURE: 147 MMHG | TEMPERATURE: 99 F | WEIGHT: 255 LBS | BODY MASS INDEX: 34.54 KG/M2 | RESPIRATION RATE: 18 BRPM | HEIGHT: 72 IN

## 2020-04-16 PROBLEM — K80.13 CALCULUS OF GALLBLADDER WITH ACUTE ON CHRONIC CHOLECYSTITIS WITH OBSTRUCTION: Status: ACTIVE | Noted: 2020-04-12

## 2020-04-16 PROCEDURE — 80053 COMPREHEN METABOLIC PANEL: CPT | Performed by: SURGERY

## 2020-04-16 PROCEDURE — 82248 BILIRUBIN DIRECT: CPT | Performed by: SURGERY

## 2020-04-16 PROCEDURE — 82550 ASSAY OF CK (CPK): CPT | Performed by: INTERNAL MEDICINE

## 2020-04-16 PROCEDURE — 86644 CMV ANTIBODY: CPT | Performed by: INTERNAL MEDICINE

## 2020-04-16 PROCEDURE — 86645 CMV ANTIBODY IGM: CPT | Performed by: INTERNAL MEDICINE

## 2020-04-16 PROCEDURE — 86664 EPSTEIN-BARR NUCLEAR ANTIGEN: CPT | Performed by: INTERNAL MEDICINE

## 2020-04-16 PROCEDURE — 85610 PROTHROMBIN TIME: CPT | Performed by: INTERNAL MEDICINE

## 2020-04-16 PROCEDURE — 86665 EPSTEIN-BARR CAPSID VCA: CPT | Performed by: INTERNAL MEDICINE

## 2020-04-16 PROCEDURE — 85027 COMPLETE CBC AUTOMATED: CPT | Performed by: SURGERY

## 2020-04-16 RX ORDER — HYDROCODONE BITARTRATE AND ACETAMINOPHEN 5; 325 MG/1; MG/1
1 TABLET ORAL EVERY 4 HOURS PRN
Qty: 20 TABLET | Refills: 0 | Status: SHIPPED | OUTPATIENT
Start: 2020-04-16 | End: 2020-04-16

## 2020-04-16 RX ORDER — TRAMADOL HYDROCHLORIDE 50 MG/1
50 TABLET ORAL EVERY 6 HOURS PRN
Qty: 15 TABLET | Refills: 0 | Status: SHIPPED | OUTPATIENT
Start: 2020-04-16 | End: 2021-06-01

## 2020-04-16 RX ORDER — LEVOFLOXACIN 500 MG/1
500 TABLET, FILM COATED ORAL DAILY
Qty: 5 TABLET | Refills: 0 | Status: SHIPPED | OUTPATIENT
Start: 2020-04-16 | End: 2020-04-21

## 2020-04-16 NOTE — PROGRESS NOTES
[facility]    General Surgery Progress Note  Sophronia Due  : 1965  CSN: [id number]  HD# 0    Subjective:   POD#1 laparoscopic cholecystectomy   No unusual complaints mild incisional pain as expected and denies N/V  Passing flatus 5. 98 3.42 2.05  --    WBC 6.7 4.4 3.6* 6.5   .0* 146.0* 158.0 164.0       Recent Labs   Lab 04/14/20  0513 04/15/20  0520 04/16/20  0517   * 116* 138*   BUN 5* 4* 7   CREATSERUM 0.86 0.88 0.85   GFRAA 112 111 113   GFRNAA 97 96 97   CA 8.3* 8

## 2020-04-16 NOTE — PLAN OF CARE
Pain managed with Norco PRN. Zosyn administration continued. Patient ambulating independently. Advanced to Soft diet for breakfast. Saline locked. Call light within reach. Plan for discharge home when medically stable.   Problem: Patient Centered Care  Goal needed  - Evaluate fluid balance  Outcome: Progressing  Goal: Maintains or returns to baseline bowel function  Description  INTERVENTIONS:  - Assess bowel function  - Maintain adequate hydration with IV or PO as ordered and tolerated  - Evaluate effectiven facility with appropriate resources  Description  INTERVENTIONS:  - Identify barriers to discharge w/pt and caregiver  - Include patient/family/discharge partner in discharge planning  - Arrange for needed discharge resources and transportation as appropri

## 2020-04-16 NOTE — PROGRESS NOTES
[facility] HOSP - [facility] Gastroenterology Progress Note    Crissy Gonzalez  [id number]  4/21/1965    No ref. provider found    24 hour events   -underwent lap yocasta yesterday.  IO cholangiogram normal  -asymptomatic- no fevers or abdominal pain Component Value Date/Time     04/16/2020 05:17 AM    K 3.9 04/16/2020 05:17 AM     04/16/2020 05:17 AM    CO2 26.0 04/16/2020 05:17 AM    BUN 7 04/16/2020 05:17 AM    CREATSERUM 0.85 04/16/2020 05:17 AM     (H) 04/16/2020 05:17 AM    [facility], [address] [place] [address],  [address] 2D RNDR  (NO IV NO ORAL) (CPT=741, 4/12/2020, 9:11 AM.  INDICATIONS: Fever with elevated LFTs.   TECHNIQUE: A comprehensive examination was performed utilizing a variety of imaging planes and imaging parame compatible with hepatic steatosis. No focal hepatic mass is seen. SPLEEN: No enlargement. ADRENALS: Unremarkable, without focal mass or enlargement. KIDNEYS: No hydronephrosis or solid masses are apparent.   VASCULATURE: Suboptimally assessed within the from GI standpoint with repeat bloodwork on Monday. Patient understands that he needs to return to ED if has recurrent fever/abdominal pain or if develops jaundice.       Ashley Sandy MD  [facility] gastroenterology

## 2020-04-16 NOTE — DISCHARGE SUMMARY
General Medicine Discharge Summary     Patient ID:  Toshia Vega  62year old  4/21/1965    Admit date: 4/12/2020    Discharge date and time: 4/16/2020    Attending Physician: Abida Eddy MD     Consults: IP CONSULT TO GASTROENTEROLOGY    Primary Care Physi pain improved no fevers, LFT's remained elevated post op, but no abd pain , no fevers, no other complaints, GI recommended repeat LFT's in 3 days as outpatient, and fu with GI in clinic next week and fu with gen surg in 1 week.   Discussed with patient hollie Finalized by (CST): Selina Torres MD on 4/14/2020 at 1:21 PM          Xr Oper Cholangiogram (cpt=74300)    Result Date: 4/15/2020  CONCLUSION:  1.  Negative intraoperative cholangiogram.  No choledocholithiasis    Dictated by (CST): Edi Michaels MD paper prescription for each of these medications  · traMADol HCl 50 MG Tabs         FU  Follow-up Information     Erlinda Manley MD. Call in 3 days. Specialty:  GASTROENTEROLOGY  Why:  GET labs drawn in 3 days with results to DR. MARTIN LIVER DOCTOR  Cont

## 2020-04-19 NOTE — PROGRESS NOTES
Noted. EBV IgM positive suggestive of recent infection and likely contributing to elevated LFTs.    Will discuss with patient during upcoming video visit on 4/21

## 2020-09-25 ENCOUNTER — OFFICE VISIT (OUTPATIENT)
Dept: FAMILY MEDICINE CLINIC | Facility: CLINIC | Age: 55
End: 2020-09-25
Payer: COMMERCIAL

## 2020-09-25 VITALS
DIASTOLIC BLOOD PRESSURE: 96 MMHG | WEIGHT: 272 LBS | BODY MASS INDEX: 36.84 KG/M2 | HEIGHT: 72 IN | HEART RATE: 71 BPM | SYSTOLIC BLOOD PRESSURE: 152 MMHG

## 2020-09-25 DIAGNOSIS — I10 ESSENTIAL HYPERTENSION: Primary | ICD-10-CM

## 2020-09-25 DIAGNOSIS — Z12.11 SCREENING FOR COLON CANCER: ICD-10-CM

## 2020-09-25 PROCEDURE — 3080F DIAST BP >= 90 MM HG: CPT | Performed by: PHYSICIAN ASSISTANT

## 2020-09-25 PROCEDURE — 3008F BODY MASS INDEX DOCD: CPT | Performed by: PHYSICIAN ASSISTANT

## 2020-09-25 PROCEDURE — 99202 OFFICE O/P NEW SF 15 MIN: CPT | Performed by: PHYSICIAN ASSISTANT

## 2020-09-25 PROCEDURE — 3077F SYST BP >= 140 MM HG: CPT | Performed by: PHYSICIAN ASSISTANT

## 2020-09-25 RX ORDER — HYDROCHLOROTHIAZIDE 12.5 MG/1
12.5 TABLET ORAL DAILY
Qty: 30 TABLET | Refills: 5 | Status: SHIPPED | OUTPATIENT
Start: 2020-09-25 | End: 2020-10-25

## 2020-09-25 NOTE — PROGRESS NOTES
HPI:   HPI  60year-old male is here in the office for established care. Patient is feeling fine at this time. Patient denies of chest pain, SOB, N/V/C/D, fever, dizziness, syncope, abdominal pain. There are no other concerns today.     Medications:     C activity: Not on file    Lifestyle      Physical activity        Days per week: Not on file        Minutes per session: Not on file      Stress: Not on file    Relationships      Social connections        Talks on phone: Not on file        Gets together: N external ear and ear canal normal. Tympanic membrane is not erythematous. No cerumen present  Nose: Nose normal.   Eyes: Conjunctivae are normal. Right eye exhibits no discharge. Left eye exhibits no discharge.    Cardiovascular: Normal rate, regular rhythm

## 2020-09-30 ENCOUNTER — LAB ENCOUNTER (OUTPATIENT)
Dept: LAB | Age: 55
End: 2020-09-30
Attending: PHYSICIAN ASSISTANT
Payer: COMMERCIAL

## 2020-09-30 DIAGNOSIS — Z12.11 SCREENING FOR COLON CANCER: ICD-10-CM

## 2020-09-30 DIAGNOSIS — I10 ESSENTIAL HYPERTENSION: ICD-10-CM

## 2020-09-30 PROCEDURE — 82306 VITAMIN D 25 HYDROXY: CPT

## 2020-09-30 PROCEDURE — 80061 LIPID PANEL: CPT

## 2020-09-30 PROCEDURE — 80053 COMPREHEN METABOLIC PANEL: CPT

## 2020-09-30 PROCEDURE — 82274 ASSAY TEST FOR BLOOD FECAL: CPT

## 2020-09-30 PROCEDURE — 83036 HEMOGLOBIN GLYCOSYLATED A1C: CPT

## 2020-09-30 PROCEDURE — 84443 ASSAY THYROID STIM HORMONE: CPT

## 2020-09-30 PROCEDURE — 36415 COLL VENOUS BLD VENIPUNCTURE: CPT

## 2020-09-30 PROCEDURE — 85025 COMPLETE CBC W/AUTO DIFF WBC: CPT

## 2021-05-27 ENCOUNTER — HOSPITAL ENCOUNTER (EMERGENCY)
Facility: HOSPITAL | Age: 56
Discharge: HOME | End: 2021-05-28
Attending: EMERGENCY MEDICINE

## 2021-05-27 ENCOUNTER — APPOINTMENT (OUTPATIENT)
Dept: CT IMAGING | Facility: HOSPITAL | Age: 56
End: 2021-05-27
Attending: EMERGENCY MEDICINE

## 2021-05-27 VITALS
BODY MASS INDEX: 36 KG/M2 | SYSTOLIC BLOOD PRESSURE: 119 MMHG | WEIGHT: 265 LBS | OXYGEN SATURATION: 98 % | DIASTOLIC BLOOD PRESSURE: 81 MMHG | TEMPERATURE: 97 F | RESPIRATION RATE: 18 BRPM | HEART RATE: 74 BPM

## 2021-05-27 DIAGNOSIS — R33.9 URINARY RETENTION: Primary | ICD-10-CM

## 2021-05-27 PROCEDURE — 81001 URINALYSIS AUTO W/SCOPE: CPT | Performed by: EMERGENCY MEDICINE

## 2021-05-27 PROCEDURE — 96360 HYDRATION IV INFUSION INIT: CPT

## 2021-05-27 PROCEDURE — 74176 CT ABD & PELVIS W/O CONTRAST: CPT | Performed by: EMERGENCY MEDICINE

## 2021-05-27 PROCEDURE — 99284 EMERGENCY DEPT VISIT MOD MDM: CPT

## 2021-05-27 PROCEDURE — 80048 BASIC METABOLIC PNL TOTAL CA: CPT | Performed by: EMERGENCY MEDICINE

## 2021-05-27 PROCEDURE — 85025 COMPLETE CBC W/AUTO DIFF WBC: CPT | Performed by: EMERGENCY MEDICINE

## 2021-05-28 NOTE — ED INITIAL ASSESSMENT (HPI)
Patient presents with difficulty urinating. Notes small amount of urine 2 hours.   Notes lower pelvic pain

## 2021-05-28 NOTE — ED PROVIDER NOTES
Patient Seen in: [facility] AND [facility] Emergency Department      History   No chief complaint on file. Stated Complaint: Urinary Retention    HPI/Subjective:   HPI  47-year-old male with hypertension presents for evaluation of urinary retention.   He last movements intact. Pupils: Pupils are equal, round, and reactive to light. Cardiovascular:      Rate and Rhythm: Normal rate and regular rhythm. Heart sounds: Normal heart sounds.    Pulmonary:      Effort: Pulmonary effort is normal.      Breath the individual orders. CT and pelvis without contrast impression: Mildly dilated bilateral ureters without obstructive uropathy. Nonobstructing punctate right upper pole renal calculus. Distended bladder without bladder wall thickening.   Bladder

## 2021-06-25 PROBLEM — N13.8 ENLARGED PROSTATE WITH URINARY OBSTRUCTION: Status: ACTIVE | Noted: 2021-06-25

## 2021-06-25 PROBLEM — N40.1 ENLARGED PROSTATE WITH URINARY OBSTRUCTION: Status: ACTIVE | Noted: 2021-06-25

## 2022-03-15 ENCOUNTER — TELEPHONE (OUTPATIENT)
Dept: FAMILY MEDICINE CLINIC | Facility: CLINIC | Age: 57
End: 2022-03-15

## 2022-03-15 DIAGNOSIS — Z12.11 COLON CANCER SCREENING: Primary | ICD-10-CM

## 2022-06-29 NOTE — TELEPHONE ENCOUNTER
Called patient to ask if they have completed their FIT test or if they need a new copy of order. Patient unavailable, left message to call back.

## 2022-08-19 ENCOUNTER — OFFICE VISIT (OUTPATIENT)
Dept: FAMILY MEDICINE CLINIC | Facility: CLINIC | Age: 57
End: 2022-08-19
Payer: COMMERCIAL

## 2022-08-19 ENCOUNTER — LAB ENCOUNTER (OUTPATIENT)
Dept: LAB | Age: 57
End: 2022-08-19
Attending: PHYSICIAN ASSISTANT
Payer: COMMERCIAL

## 2022-08-19 ENCOUNTER — EKG ENCOUNTER (OUTPATIENT)
Dept: LAB | Age: 57
End: 2022-08-19
Attending: PHYSICIAN ASSISTANT
Payer: COMMERCIAL

## 2022-08-19 VITALS
BODY MASS INDEX: 38.19 KG/M2 | WEIGHT: 282 LBS | DIASTOLIC BLOOD PRESSURE: 86 MMHG | SYSTOLIC BLOOD PRESSURE: 142 MMHG | HEART RATE: 65 BPM | HEIGHT: 72 IN

## 2022-08-19 DIAGNOSIS — E55.9 VITAMIN D DEFICIENCY: ICD-10-CM

## 2022-08-19 DIAGNOSIS — Z12.11 SCREENING FOR MALIGNANT NEOPLASM OF COLON: ICD-10-CM

## 2022-08-19 DIAGNOSIS — Z00.00 ROUTINE GENERAL MEDICAL EXAMINATION AT A HEALTH CARE FACILITY: Primary | ICD-10-CM

## 2022-08-19 DIAGNOSIS — Z12.5 SCREENING FOR MALIGNANT NEOPLASM OF PROSTATE: ICD-10-CM

## 2022-08-19 DIAGNOSIS — R73.03 PREDIABETES: ICD-10-CM

## 2022-08-19 DIAGNOSIS — Z00.00 ROUTINE GENERAL MEDICAL EXAMINATION AT A HEALTH CARE FACILITY: ICD-10-CM

## 2022-08-19 DIAGNOSIS — E66.01 MORBIDLY OBESE (HCC): ICD-10-CM

## 2022-08-19 LAB
ALBUMIN SERPL-MCNC: 3.6 G/DL (ref 3.4–5)
ALBUMIN/GLOB SERPL: 1 {RATIO} (ref 1–2)
ALP LIVER SERPL-CCNC: 73 U/L
ALT SERPL-CCNC: 79 U/L
ANION GAP SERPL CALC-SCNC: 5 MMOL/L (ref 0–18)
AST SERPL-CCNC: 32 U/L (ref 15–37)
BASOPHILS # BLD AUTO: 0.02 X10(3) UL (ref 0–0.2)
BASOPHILS NFR BLD AUTO: 0.3 %
BILIRUB SERPL-MCNC: 0.5 MG/DL (ref 0.1–2)
BUN BLD-MCNC: 11 MG/DL (ref 7–18)
BUN/CREAT SERPL: 12.1 (ref 10–20)
CALCIUM BLD-MCNC: 9 MG/DL (ref 8.5–10.1)
CHLORIDE SERPL-SCNC: 107 MMOL/L (ref 98–112)
CHOLEST SERPL-MCNC: 178 MG/DL (ref ?–200)
CO2 SERPL-SCNC: 24 MMOL/L (ref 21–32)
COMPLEXED PSA SERPL-MCNC: 1.67 NG/ML (ref ?–4)
CREAT BLD-MCNC: 0.91 MG/DL
DEPRECATED RDW RBC AUTO: 41.7 FL (ref 35.1–46.3)
EOSINOPHIL # BLD AUTO: 0.09 X10(3) UL (ref 0–0.7)
EOSINOPHIL NFR BLD AUTO: 1.3 %
ERYTHROCYTE [DISTWIDTH] IN BLOOD BY AUTOMATED COUNT: 12.2 % (ref 11–15)
EST. AVERAGE GLUCOSE BLD GHB EST-MCNC: 163 MG/DL (ref 68–126)
FASTING PATIENT LIPID ANSWER: YES
FASTING STATUS PATIENT QL REPORTED: YES
GFR SERPLBLD BASED ON 1.73 SQ M-ARVRAT: 97 ML/MIN/1.73M2 (ref 60–?)
GLOBULIN PLAS-MCNC: 3.6 G/DL (ref 2.8–4.4)
GLUCOSE BLD-MCNC: 144 MG/DL (ref 70–99)
HBA1C MFR BLD: 7.3 % (ref ?–5.7)
HCT VFR BLD AUTO: 47.3 %
HDLC SERPL-MCNC: 46 MG/DL (ref 40–59)
HGB BLD-MCNC: 16.5 G/DL
IMM GRANULOCYTES # BLD AUTO: 0.02 X10(3) UL (ref 0–1)
IMM GRANULOCYTES NFR BLD: 0.3 %
LDLC SERPL CALC-MCNC: 108 MG/DL (ref ?–100)
LYMPHOCYTES # BLD AUTO: 2.02 X10(3) UL (ref 1–4)
LYMPHOCYTES NFR BLD AUTO: 30.2 %
MCH RBC QN AUTO: 32 PG (ref 26–34)
MCHC RBC AUTO-ENTMCNC: 34.9 G/DL (ref 31–37)
MCV RBC AUTO: 91.7 FL
MONOCYTES # BLD AUTO: 0.49 X10(3) UL (ref 0.1–1)
MONOCYTES NFR BLD AUTO: 7.3 %
NEUTROPHILS # BLD AUTO: 4.04 X10 (3) UL (ref 1.5–7.7)
NEUTROPHILS # BLD AUTO: 4.04 X10(3) UL (ref 1.5–7.7)
NEUTROPHILS NFR BLD AUTO: 60.6 %
NONHDLC SERPL-MCNC: 132 MG/DL (ref ?–130)
OSMOLALITY SERPL CALC.SUM OF ELEC: 284 MOSM/KG (ref 275–295)
PLATELET # BLD AUTO: 200 10(3)UL (ref 150–450)
POTASSIUM SERPL-SCNC: 3.9 MMOL/L (ref 3.5–5.1)
PROT SERPL-MCNC: 7.2 G/DL (ref 6.4–8.2)
RBC # BLD AUTO: 5.16 X10(6)UL
SODIUM SERPL-SCNC: 136 MMOL/L (ref 136–145)
TRIGL SERPL-MCNC: 137 MG/DL (ref 30–149)
TSI SER-ACNC: 1.32 MIU/ML (ref 0.36–3.74)
VIT D+METAB SERPL-MCNC: 32.4 NG/ML (ref 30–100)
VLDLC SERPL CALC-MCNC: 23 MG/DL (ref 0–30)
WBC # BLD AUTO: 6.7 X10(3) UL (ref 4–11)

## 2022-08-19 PROCEDURE — 3077F SYST BP >= 140 MM HG: CPT | Performed by: PHYSICIAN ASSISTANT

## 2022-08-19 PROCEDURE — 84443 ASSAY THYROID STIM HORMONE: CPT

## 2022-08-19 PROCEDURE — 36415 COLL VENOUS BLD VENIPUNCTURE: CPT

## 2022-08-19 PROCEDURE — 3079F DIAST BP 80-89 MM HG: CPT | Performed by: PHYSICIAN ASSISTANT

## 2022-08-19 PROCEDURE — 80053 COMPREHEN METABOLIC PANEL: CPT

## 2022-08-19 PROCEDURE — 85025 COMPLETE CBC W/AUTO DIFF WBC: CPT

## 2022-08-19 PROCEDURE — 3008F BODY MASS INDEX DOCD: CPT | Performed by: PHYSICIAN ASSISTANT

## 2022-08-19 PROCEDURE — 99396 PREV VISIT EST AGE 40-64: CPT | Performed by: PHYSICIAN ASSISTANT

## 2022-08-19 PROCEDURE — 83036 HEMOGLOBIN GLYCOSYLATED A1C: CPT

## 2022-08-19 PROCEDURE — 93010 ELECTROCARDIOGRAM REPORT: CPT | Performed by: PHYSICIAN ASSISTANT

## 2022-08-19 PROCEDURE — 93005 ELECTROCARDIOGRAM TRACING: CPT

## 2022-08-19 PROCEDURE — 99213 OFFICE O/P EST LOW 20 MIN: CPT | Performed by: PHYSICIAN ASSISTANT

## 2022-08-19 PROCEDURE — 82306 VITAMIN D 25 HYDROXY: CPT

## 2022-08-19 PROCEDURE — 80061 LIPID PANEL: CPT

## 2022-08-19 RX ORDER — PHENTERMINE HYDROCHLORIDE 30 MG/1
30 CAPSULE ORAL EVERY MORNING
Qty: 30 CAPSULE | Refills: 3 | Status: SHIPPED | OUTPATIENT
Start: 2022-08-19

## 2022-09-14 DIAGNOSIS — E66.01 MORBIDLY OBESE (HCC): ICD-10-CM

## 2022-09-14 RX ORDER — PHENTERMINE HYDROCHLORIDE 30 MG/1
30 CAPSULE ORAL EVERY MORNING
Qty: 30 CAPSULE | Refills: 3 | Status: CANCELLED | OUTPATIENT
Start: 2022-09-14

## 2022-09-15 NOTE — TELEPHONE ENCOUNTER
Pharmacy    [address] [facility] [facility] [phone], [phone]      Disp Refills Start End    Phentermine HCl 30 MG Oral Cap 30 capsule 3 8/19/2022     Sig - Route:  Take 1 capsule (30 mg total) by mouth every morning. - Oral    Sent to pharmacy as: Phentermine HCl 30 MG Oral Capsule    E-Prescribing Status: Receipt confirmed by pharmacy (8/19/2022 10:49 AM CDT)

## 2022-12-08 ENCOUNTER — OFFICE VISIT (OUTPATIENT)
Dept: FAMILY MEDICINE CLINIC | Facility: CLINIC | Age: 57
End: 2022-12-08
Payer: COMMERCIAL

## 2022-12-08 ENCOUNTER — HOSPITAL ENCOUNTER (OUTPATIENT)
Dept: ULTRASOUND IMAGING | Facility: HOSPITAL | Age: 57
Discharge: HOME | End: 2022-12-08
Attending: PHYSICIAN ASSISTANT
Payer: COMMERCIAL

## 2022-12-08 VITALS
WEIGHT: 268 LBS | HEART RATE: 79 BPM | DIASTOLIC BLOOD PRESSURE: 96 MMHG | SYSTOLIC BLOOD PRESSURE: 154 MMHG | HEIGHT: 72 IN | BODY MASS INDEX: 36.3 KG/M2

## 2022-12-08 DIAGNOSIS — M62.830 SPASM OF MUSCLE OF LOWER BACK: ICD-10-CM

## 2022-12-08 DIAGNOSIS — N50.812 PAIN IN LEFT TESTICLE: ICD-10-CM

## 2022-12-08 DIAGNOSIS — I86.1 BILATERAL VARICOCELES: ICD-10-CM

## 2022-12-08 DIAGNOSIS — I10 ESSENTIAL HYPERTENSION: Primary | ICD-10-CM

## 2022-12-08 DIAGNOSIS — K40.20 BILATERAL INGUINAL HERNIA WITHOUT OBSTRUCTION OR GANGRENE, RECURRENCE NOT SPECIFIED: ICD-10-CM

## 2022-12-08 LAB
BILIRUBIN: NEGATIVE
GLUCOSE (URINE DIPSTICK): 100 MG/DL
KETONES (URINE DIPSTICK): NEGATIVE MG/DL
MULTISTIX LOT#: ABNORMAL NUMERIC
NITRITE, URINE: NEGATIVE
OCCULT BLOOD: NEGATIVE
PH, URINE: 5.5 (ref 4.5–8)
PROTEIN (URINE DIPSTICK): NEGATIVE MG/DL
SPECIFIC GRAVITY: 1.03 (ref 1–1.03)
UROBILINOGEN,SEMI-QN: 0.2 MG/DL (ref 0–1.9)

## 2022-12-08 PROCEDURE — 99214 OFFICE O/P EST MOD 30 MIN: CPT | Performed by: PHYSICIAN ASSISTANT

## 2022-12-08 PROCEDURE — 93975 VASCULAR STUDY: CPT | Performed by: PHYSICIAN ASSISTANT

## 2022-12-08 PROCEDURE — 76870 US EXAM SCROTUM: CPT | Performed by: PHYSICIAN ASSISTANT

## 2022-12-08 PROCEDURE — 3008F BODY MASS INDEX DOCD: CPT | Performed by: PHYSICIAN ASSISTANT

## 2022-12-08 PROCEDURE — 3080F DIAST BP >= 90 MM HG: CPT | Performed by: PHYSICIAN ASSISTANT

## 2022-12-08 PROCEDURE — 81003 URINALYSIS AUTO W/O SCOPE: CPT | Performed by: PHYSICIAN ASSISTANT

## 2022-12-08 PROCEDURE — 3077F SYST BP >= 140 MM HG: CPT | Performed by: PHYSICIAN ASSISTANT

## 2022-12-08 RX ORDER — LISINOPRIL 20 MG/1
20 TABLET ORAL DAILY
Qty: 90 TABLET | Refills: 1 | Status: SHIPPED | OUTPATIENT
Start: 2022-12-08 | End: 2023-03-08

## 2022-12-08 RX ORDER — METHYLPREDNISOLONE 4 MG/1
TABLET ORAL
Qty: 21 EACH | Refills: 0 | Status: SHIPPED | OUTPATIENT
Start: 2022-12-08

## 2022-12-09 LAB
C TRACH DNA SPEC QL NAA+PROBE: NEGATIVE
N GONORRHOEA DNA SPEC QL NAA+PROBE: NEGATIVE

## 2022-12-14 ENCOUNTER — PATIENT MESSAGE (OUTPATIENT)
Dept: FAMILY MEDICINE CLINIC | Facility: CLINIC | Age: 57
End: 2022-12-14

## 2022-12-14 DIAGNOSIS — E66.01 MORBIDLY OBESE (HCC): ICD-10-CM

## 2022-12-14 RX ORDER — PHENTERMINE HYDROCHLORIDE 30 MG/1
30 CAPSULE ORAL EVERY MORNING
Qty: 30 CAPSULE | Refills: 3 | Status: SHIPPED | OUTPATIENT
Start: 2022-12-14

## 2022-12-15 NOTE — TELEPHONE ENCOUNTER
From: [place]  To: Kelly Wadsworth PA-C  Sent: 12/14/2022 10:24 AM CST  Subject: Weight loss med    Good morning Nabor,   Last i saw you I forgot to ask about keeping my weight loss med going. I have no more refills and only have about 4-5 pills left. Is there a chance you can send a new script to my [facility]? ? Also i wanted to let you know i do have an appointment with Dr Sujatha Strickland On the 22nd at 345pm. I still need to reach out to Urologist.     Thank you and have a wonderful holiday if i do not see you before then.      Dayday Magana

## 2022-12-27 ENCOUNTER — OFFICE VISIT (OUTPATIENT)
Dept: SURGERY | Facility: CLINIC | Age: 57
End: 2022-12-27
Payer: COMMERCIAL

## 2022-12-27 VITALS — BODY MASS INDEX: 36 KG/M2 | WEIGHT: 268 LBS

## 2022-12-27 DIAGNOSIS — K42.9 UMBILICAL HERNIA WITHOUT OBSTRUCTION AND WITHOUT GANGRENE: ICD-10-CM

## 2022-12-27 DIAGNOSIS — K40.20 NON-RECURRENT BILATERAL INGUINAL HERNIA WITHOUT OBSTRUCTION OR GANGRENE: Primary | ICD-10-CM

## 2022-12-27 PROBLEM — K40.90 NON-RECURRENT UNILATERAL INGUINAL HERNIA WITHOUT OBSTRUCTION OR GANGRENE: Status: ACTIVE | Noted: 2022-12-27

## 2022-12-27 PROCEDURE — 99203 OFFICE O/P NEW LOW 30 MIN: CPT | Performed by: SURGERY

## 2023-03-31 ENCOUNTER — LAB ENCOUNTER (OUTPATIENT)
Dept: LAB | Facility: REFERENCE LAB | Age: 58
End: 2023-03-31
Attending: PHYSICIAN ASSISTANT
Payer: COMMERCIAL

## 2023-03-31 ENCOUNTER — PATIENT MESSAGE (OUTPATIENT)
Dept: FAMILY MEDICINE CLINIC | Facility: CLINIC | Age: 58
End: 2023-03-31

## 2023-03-31 ENCOUNTER — OFFICE VISIT (OUTPATIENT)
Dept: FAMILY MEDICINE CLINIC | Facility: CLINIC | Age: 58
End: 2023-03-31

## 2023-03-31 VITALS
DIASTOLIC BLOOD PRESSURE: 70 MMHG | HEIGHT: 72 IN | BODY MASS INDEX: 35.89 KG/M2 | WEIGHT: 265 LBS | SYSTOLIC BLOOD PRESSURE: 100 MMHG | HEART RATE: 71 BPM

## 2023-03-31 DIAGNOSIS — N52.9 ERECTILE DYSFUNCTION, UNSPECIFIED ERECTILE DYSFUNCTION TYPE: ICD-10-CM

## 2023-03-31 DIAGNOSIS — I10 ESSENTIAL HYPERTENSION: ICD-10-CM

## 2023-03-31 DIAGNOSIS — E11.9 DIABETES MELLITUS WITHOUT COMPLICATION (HCC): ICD-10-CM

## 2023-03-31 DIAGNOSIS — E11.9 TYPE 2 DIABETES MELLITUS WITHOUT COMPLICATION, WITHOUT LONG-TERM CURRENT USE OF INSULIN (HCC): Primary | ICD-10-CM

## 2023-03-31 LAB
ALBUMIN SERPL-MCNC: 3.9 G/DL (ref 3.4–5)
ALBUMIN/GLOB SERPL: 1.1 {RATIO} (ref 1–2)
ALP LIVER SERPL-CCNC: 73 U/L
ALT SERPL-CCNC: 66 U/L
ANION GAP SERPL CALC-SCNC: 11 MMOL/L (ref 0–18)
AST SERPL-CCNC: 30 U/L (ref 15–37)
BILIRUB SERPL-MCNC: 0.7 MG/DL (ref 0.1–2)
BUN BLD-MCNC: 12 MG/DL (ref 7–18)
BUN/CREAT SERPL: 11.7 (ref 10–20)
CALCIUM BLD-MCNC: 9.5 MG/DL (ref 8.5–10.1)
CARTRIDGE LOT#: ABNORMAL NUMERIC
CHLORIDE SERPL-SCNC: 104 MMOL/L (ref 98–112)
CHOLEST SERPL-MCNC: 178 MG/DL (ref ?–200)
CO2 SERPL-SCNC: 23 MMOL/L (ref 21–32)
CREAT BLD-MCNC: 1.03 MG/DL
CREAT UR-SCNC: 224 MG/DL
FASTING PATIENT LIPID ANSWER: YES
FASTING STATUS PATIENT QL REPORTED: YES
GFR SERPLBLD BASED ON 1.73 SQ M-ARVRAT: 84 ML/MIN/1.73M2 (ref 60–?)
GLOBULIN PLAS-MCNC: 3.6 G/DL (ref 2.8–4.4)
GLUCOSE BLD-MCNC: 182 MG/DL (ref 70–99)
HDLC SERPL-MCNC: 52 MG/DL (ref 40–59)
HEMOGLOBIN A1C: 7.1 % (ref 4.3–5.6)
LDLC SERPL CALC-MCNC: 107 MG/DL (ref ?–100)
MICROALBUMIN UR-MCNC: 0.98 MG/DL
MICROALBUMIN/CREAT 24H UR-RTO: 4.4 UG/MG (ref ?–30)
NONHDLC SERPL-MCNC: 126 MG/DL (ref ?–130)
OSMOLALITY SERPL CALC.SUM OF ELEC: 290 MOSM/KG (ref 275–295)
POTASSIUM SERPL-SCNC: 3.9 MMOL/L (ref 3.5–5.1)
PROT SERPL-MCNC: 7.5 G/DL (ref 6.4–8.2)
SODIUM SERPL-SCNC: 138 MMOL/L (ref 136–145)
TRIGL SERPL-MCNC: 107 MG/DL (ref 30–149)
VLDLC SERPL CALC-MCNC: 18 MG/DL (ref 0–30)

## 2023-03-31 PROCEDURE — 82043 UR ALBUMIN QUANTITATIVE: CPT

## 2023-03-31 PROCEDURE — 80053 COMPREHEN METABOLIC PANEL: CPT

## 2023-03-31 PROCEDURE — 3051F HG A1C>EQUAL 7.0%<8.0%: CPT | Performed by: PHYSICIAN ASSISTANT

## 2023-03-31 PROCEDURE — 36415 COLL VENOUS BLD VENIPUNCTURE: CPT

## 2023-03-31 PROCEDURE — 84402 ASSAY OF FREE TESTOSTERONE: CPT

## 2023-03-31 PROCEDURE — 82570 ASSAY OF URINE CREATININE: CPT

## 2023-03-31 PROCEDURE — 3078F DIAST BP <80 MM HG: CPT | Performed by: PHYSICIAN ASSISTANT

## 2023-03-31 PROCEDURE — 3008F BODY MASS INDEX DOCD: CPT | Performed by: PHYSICIAN ASSISTANT

## 2023-03-31 PROCEDURE — 99214 OFFICE O/P EST MOD 30 MIN: CPT | Performed by: PHYSICIAN ASSISTANT

## 2023-03-31 PROCEDURE — 80061 LIPID PANEL: CPT

## 2023-03-31 PROCEDURE — 3061F NEG MICROALBUMINURIA REV: CPT | Performed by: PHYSICIAN ASSISTANT

## 2023-03-31 PROCEDURE — 84403 ASSAY OF TOTAL TESTOSTERONE: CPT

## 2023-03-31 PROCEDURE — 83036 HEMOGLOBIN GLYCOSYLATED A1C: CPT | Performed by: PHYSICIAN ASSISTANT

## 2023-03-31 PROCEDURE — 3074F SYST BP LT 130 MM HG: CPT | Performed by: PHYSICIAN ASSISTANT

## 2023-03-31 RX ORDER — VARDENAFIL HYDROCHLORIDE 10 MG/1
10 TABLET ORAL
Qty: 9 TABLET | Refills: 5 | Status: SHIPPED | OUTPATIENT
Start: 2023-03-31

## 2023-03-31 RX ORDER — LISINOPRIL 20 MG/1
20 TABLET ORAL DAILY
COMMUNITY
End: 2023-03-31 | Stop reason: ALTCHOICE

## 2023-03-31 RX ORDER — PEN NEEDLE, DIABETIC 30 GX3/16"
1 NEEDLE, DISPOSABLE MISCELLANEOUS
Qty: 30 EACH | Refills: 0 | Status: SHIPPED | OUTPATIENT
Start: 2023-03-31

## 2023-03-31 RX ORDER — SEMAGLUTIDE 1.34 MG/ML
0.25 INJECTION, SOLUTION SUBCUTANEOUS
Qty: 1.5 ML | Refills: 0 | Status: SHIPPED | OUTPATIENT
Start: 2023-03-31 | End: 2023-04-28

## 2023-04-01 PROBLEM — E11.9 TYPE 2 DIABETES MELLITUS WITHOUT COMPLICATION, WITHOUT LONG-TERM CURRENT USE OF INSULIN (HCC): Status: ACTIVE | Noted: 2023-04-01

## 2023-04-01 NOTE — TELEPHONE ENCOUNTER
Triage Support, please assist    Patient (name and  verified) calling regarding prior authorization for the Ozempic medication. See Kybernesishart message.

## 2023-04-02 ENCOUNTER — PATIENT MESSAGE (OUTPATIENT)
Dept: FAMILY MEDICINE CLINIC | Facility: CLINIC | Age: 58
End: 2023-04-02

## 2023-04-06 LAB
TESTOSTERONE, FREE, S: 7.49 NG/DL
TESTOSTERONE, TOTAL, S: 314 NG/DL

## 2023-04-11 ENCOUNTER — PATIENT MESSAGE (OUTPATIENT)
Dept: FAMILY MEDICINE CLINIC | Facility: CLINIC | Age: 58
End: 2023-04-11

## 2023-04-13 ENCOUNTER — TELEPHONE (OUTPATIENT)
Dept: FAMILY MEDICINE CLINIC | Facility: CLINIC | Age: 58
End: 2023-04-13

## 2023-04-13 NOTE — TELEPHONE ENCOUNTER
PA for Ozempic denied, may need trial of metformin      Prior authorization initiated for Mounjaro,await 1-5 days for decision

## 2023-04-20 ENCOUNTER — PATIENT MESSAGE (OUTPATIENT)
Dept: FAMILY MEDICINE CLINIC | Facility: CLINIC | Age: 58
End: 2023-04-20

## 2023-04-20 RX ORDER — METFORMIN HYDROCHLORIDE 500 MG/1
500 TABLET, EXTENDED RELEASE ORAL DAILY
Qty: 90 TABLET | Refills: 1 | Status: SHIPPED | OUTPATIENT
Start: 2023-04-20 | End: 2023-07-19

## 2023-05-04 ENCOUNTER — PATIENT MESSAGE (OUTPATIENT)
Dept: FAMILY MEDICINE CLINIC | Facility: CLINIC | Age: 58
End: 2023-05-04

## 2023-05-04 NOTE — TELEPHONE ENCOUNTER
From: Carli Quiñonez  To: Adelle Ahumada, PA-C  Sent: 5/4/2023 10:16 AM CDT  Subject: Metformin    I have been taking the Metformin for a little bit of time now and have been experiencing some side effects such as heartburn and diarrhea. I have done my research and this seems to be fairly normal for Metformin. Any suggestions? ?

## 2023-05-05 NOTE — TELEPHONE ENCOUNTER
Yes, it could be the side effects of Metformin. Will discontinue Metformin if patient is unable to tolerate. Switch to Jardiance 10 mg PO QAM. Rx sent to pharmacy.

## 2023-05-11 NOTE — TELEPHONE ENCOUNTER
Spoke to [employer] and they said a prior authorization is needed.  They faxed over a request and also through covermymeds

## 2023-05-11 NOTE — TELEPHONE ENCOUNTER
Prior authorization for jardiance was done through [facility].  It can take 1-5 business days for a decision to come back

## 2023-05-12 NOTE — TELEPHONE ENCOUNTER
Approved    Prior authorization approved Case ID: [id number]      Payer:  [address]   [phone]  [place] Patricia    RWIWRZ:[id number];GUAATAWANA:[id number]; Review Type:Prior Auth; Coverage Start Date:05/11/2023; Coverage End Date:12/31/2099;    Approval Details    Authorized from May 11, 2023 to December 31, 2099      Electronic appeal:  Not supported   View History

## 2023-05-18 ENCOUNTER — PATIENT MESSAGE (OUTPATIENT)
Dept: FAMILY MEDICINE CLINIC | Facility: CLINIC | Age: 58
End: 2023-05-18

## 2023-05-19 NOTE — TELEPHONE ENCOUNTER
From: Elisha Sher  To: Truong Yi PA-C  Sent: 5/18/2023 7:16 PM CDT  Subject: Phentermine HCl 30 MG     Hi Nabor,   I see you have chosen to not refill my Phentermine HCl 30 MG. Is there an alternative to this that is same strength or a bit stronger? ? patient independent in all bathroom activities.

## 2023-05-19 NOTE — TELEPHONE ENCOUNTER
Caryl Monroe PA-C, please see Wallyt message and advise. Thanks.     Per last refill encounter:    Request refused: Refill not appropriate (medication was discontinued.)

## 2023-06-05 ENCOUNTER — PATIENT MESSAGE (OUTPATIENT)
Dept: FAMILY MEDICINE CLINIC | Facility: CLINIC | Age: 58
End: 2023-06-05

## 2023-06-06 NOTE — TELEPHONE ENCOUNTER
LALIT Evans=see message and advice, thanks. [email or web address]  SERIOUS JARDIANCE SIDE EFFECTS INCLUDE:  Ketoacidosis (increased ketones in your blood or urine)  Dehydration  Serious urinary tract infections  Low blood sugar (also called hypoglycemia)  Necrotizing fasciitis  Vaginal yeast infection  Yeast infection of the penis  Allergic reactions (hypersensitivity)        Haroldo Pyle RN 6/6/2023  6:02 PM CDT        ----- Message -----  From: Olga Clark  Sent: 6/5/2023   3:31 PM CDT  To: Em Triage Support  Subject: Kaycee Reaves                                        I wanted to ask a quick question about the Jardiance. Since I started taking it I have been waking up in the middle of the night having to go to the bathroom 2 or 3 times a night. Also, while working a normal 10hour day at work I am having to go to the bathroom 8-10 times a day. Is this side effects of the medication? ?  Is there something I can do to help slow the bathroom breaks down? ?   Or is this just the nature of the beast!?

## 2023-06-07 RX ORDER — TIRZEPATIDE 2.5 MG/.5ML
2.5 INJECTION, SOLUTION SUBCUTANEOUS
Qty: 2 ML | Refills: 0 | Status: SHIPPED | OUTPATIENT
Start: 2023-06-07 | End: 2023-07-07

## 2023-06-07 NOTE — TELEPHONE ENCOUNTER
Spoke with patient ( & Name verified). Discussed with patient that [place] Marquez can cause urinary frequency. Re-start Mounjaro 2.5 mg Qweek. Rx sent to [place]. Patient will discontinue Jardiance and Phentermine if starting Mounjaro. Patient verbalized understanding. Please do Prior Authorization for [facility] again. Patient is unable to tolerate Metformin ( due to diarrhea, abdominal pain) and Jardiance ( due to urinary frequency).

## 2023-06-15 ENCOUNTER — TELEPHONE (OUTPATIENT)
Dept: FAMILY MEDICINE CLINIC | Facility: CLINIC | Age: 58
End: 2023-06-15

## 2023-06-15 DIAGNOSIS — N40.0 BENIGN PROSTATIC HYPERPLASIA WITHOUT LOWER URINARY TRACT SYMPTOMS: Primary | ICD-10-CM

## 2023-06-15 NOTE — TELEPHONE ENCOUNTER
Pt called to ask LALIT Rodney to refer to a urologist that he can get in to before Oct/Nov, which is the wait for Dr Clive Wells.  See pt's Mychart. I recommended patient accept the appointment with Dr Clive Wells, get on wait list in the meantime.       MA call patient at: [phone] cell

## 2023-06-19 ENCOUNTER — OFFICE VISIT (OUTPATIENT)
Dept: SURGERY | Facility: CLINIC | Age: 58
End: 2023-06-19

## 2023-06-19 VITALS — HEART RATE: 79 BPM | SYSTOLIC BLOOD PRESSURE: 169 MMHG | DIASTOLIC BLOOD PRESSURE: 102 MMHG

## 2023-06-19 DIAGNOSIS — N52.9 ERECTILE DYSFUNCTION, UNSPECIFIED ERECTILE DYSFUNCTION TYPE: ICD-10-CM

## 2023-06-19 DIAGNOSIS — R33.9 URINARY RETENTION: Primary | ICD-10-CM

## 2023-06-19 DIAGNOSIS — N40.1 BPH WITH OBSTRUCTION/LOWER URINARY TRACT SYMPTOMS: ICD-10-CM

## 2023-06-19 DIAGNOSIS — N13.8 BPH WITH OBSTRUCTION/LOWER URINARY TRACT SYMPTOMS: ICD-10-CM

## 2023-06-19 PROCEDURE — 51798 US URINE CAPACITY MEASURE: CPT | Performed by: UROLOGY

## 2023-06-19 PROCEDURE — 3080F DIAST BP >= 90 MM HG: CPT | Performed by: UROLOGY

## 2023-06-19 PROCEDURE — 99204 OFFICE O/P NEW MOD 45 MIN: CPT | Performed by: UROLOGY

## 2023-06-19 PROCEDURE — 3077F SYST BP >= 140 MM HG: CPT | Performed by: UROLOGY

## 2023-06-19 RX ORDER — TAMSULOSIN HYDROCHLORIDE 0.4 MG/1
0.4 CAPSULE ORAL
Qty: 30 CAPSULE | Refills: 3 | Status: SHIPPED | OUTPATIENT
Start: 2023-06-19

## 2023-06-19 RX ORDER — TAMSULOSIN HYDROCHLORIDE 0.4 MG/1
0.4 CAPSULE ORAL DAILY
COMMUNITY
Start: 2023-06-12 | End: 2023-06-19

## 2023-06-19 NOTE — PROGRESS NOTES
I was given orders by [facility] to perform a voiding trail decath followed by bladder scan and if pt passes and is able to urinate an adequate amt and not reatined urine than he will have a converstaion with pt about adding on Finasteride. If pt cannot urinate then replace a 16 FR coude tip cath to leg drainage bag and schd pt for uroflow/cysto/trus. I went to the exam room and I introduced myself to the pt and verified his name and  and I explained 's orders and I assisted pt onto the table and had him lower his bottom clothing to his ankles and I placed him on the table in supine position. I proceeded to detach the leg bag and extension tubing from the vigil cath and I discarded them. I then removed the vigil cath from the STAT lock at his upper inner Rt. Thigh and I then removed the STAT lock also. I then deflated the vigil cath balloon of its contents ( 10 ml) and I cleansed the end of the vigil cath with a couple of alcohol wipes and then inserted the barrel of a maria dolores syringe into the vigil cath. I then proceeded to fill the pt's bladder with 300 ml of 0.9 NS via gravity at which point pt stated that he felt full and thought he could urinate. I then removed the vigil cath gently and slowly and then assisted the pt to a standing position and I gave him a graduate to urinate into and I also turned on the faucet water at a trickle to help stimulate urination. Pt was able to urinate 250 ml of clear yellow urine with a good stream as reported by pt. Pt denied pain or burning with urination and he tolerated it well. I explained that [facility] will be in shortly to discuss the next steps.

## 2023-07-13 ENCOUNTER — PROCEDURE (OUTPATIENT)
Dept: SURGERY | Facility: CLINIC | Age: 58
End: 2023-07-13

## 2023-07-13 VITALS — HEART RATE: 66 BPM | DIASTOLIC BLOOD PRESSURE: 90 MMHG | SYSTOLIC BLOOD PRESSURE: 140 MMHG

## 2023-07-13 DIAGNOSIS — N13.8 BPH WITH OBSTRUCTION/LOWER URINARY TRACT SYMPTOMS: Primary | ICD-10-CM

## 2023-07-13 DIAGNOSIS — R33.9 URINARY RETENTION: ICD-10-CM

## 2023-07-13 DIAGNOSIS — N40.1 BPH WITH OBSTRUCTION/LOWER URINARY TRACT SYMPTOMS: Primary | ICD-10-CM

## 2023-07-13 DIAGNOSIS — N52.9 ERECTILE DYSFUNCTION, UNSPECIFIED ERECTILE DYSFUNCTION TYPE: ICD-10-CM

## 2023-07-13 PROCEDURE — 3080F DIAST BP >= 90 MM HG: CPT | Performed by: UROLOGY

## 2023-07-13 PROCEDURE — 76872 US TRANSRECTAL: CPT | Performed by: UROLOGY

## 2023-07-13 PROCEDURE — 51741 ELECTRO-UROFLOWMETRY FIRST: CPT | Performed by: UROLOGY

## 2023-07-13 PROCEDURE — 52000 CYSTOURETHROSCOPY: CPT | Performed by: UROLOGY

## 2023-07-13 PROCEDURE — 99213 OFFICE O/P EST LOW 20 MIN: CPT | Performed by: UROLOGY

## 2023-07-13 PROCEDURE — 3077F SYST BP >= 140 MM HG: CPT | Performed by: UROLOGY

## 2023-07-13 RX ORDER — FINASTERIDE 5 MG/1
5 TABLET, FILM COATED ORAL DAILY
Qty: 90 TABLET | Refills: 1 | Status: SHIPPED | OUTPATIENT
Start: 2023-07-13

## 2023-07-13 RX ORDER — CIPROFLOXACIN 500 MG/1
500 TABLET, FILM COATED ORAL ONCE
Status: SHIPPED | OUTPATIENT
Start: 2023-07-13

## 2023-07-13 NOTE — PROGRESS NOTES
[facility] Urology  Follow-Up Visit    HPI: Sherman Beard is a 59year old male presents for a follow up visit. Patient was last seen on 6/19/23. Here by himself. INTERVAL HISTORY: Patient here for f/u on BPH with urinary retention. On daily Flomax 0.4 mg. Here for uroflow/cystoscopy/TRUS for further evaluation of his BPH with LUTS in consideration for further management. No gross hematuria or dysuria. 1. BPH with LUTS   2. Urinary Retention  Patient with known BPH. History of acute urinary retention 5/2021. CT 5/27/2021 revealing an enlarged prostate measuring 6.2 x 4.7 cm in axial dimension. Seen by [facility] (Dr. Rishi Avilez), patient passed voiding trial 6/1/2021. He was on Flomax transiently at the time however stopped the medication his symptoms are back to baseline. He was in [place] last week when he went into acute urinary retention again. He had a few alcoholic beverages after which she was unable to urinate. He was taken to the ER and a Brink catheter was inserted, reportedly draining about 1.5 L. He was restarted on Flomax. He was given a prescription for Cipro for possible UTI. No outside records available. He presents for voiding trial and further evaluation and management. He denies fevers or chills, gross hematuria or dysuria. His baseline IPSS is 18 (4/4/0/3/2/2/3) with a quality-of-life score of 4. He awakens about 3 times at night to urinate and has sensation of incomplete bladder emptying, frequency, weak stream and urgency. He has baseline erectile dysfunction. Takes vardenafil as needed with acceptable results. No family history of prostate cancer. Screening PSA level from 08/2022 normal at 1.67 ng/mL. - 6/2023 Consult: Patient passed voiding trial.  PVR 9 mL. Continue Flomax. Interested in MIST's.      - 7/2023 F/U: On Flomax daily.  - Uroflow + PVR: Voided 63.1 mL, Qmax 7.3 mL/s, Qave 5.4 mL/s, flow time 11.5 sec, voiding time 12.5 sec, voiding curve shape flattened with low peak, PVR 91 mL. - Prostate volume on TRUS: 82.85 cc.    - Cystoscopy revealing moderate BPH with trilobar hypertrophy including kissing lateral lobes and median lobe enlargement with mild intravesical extension. Discussed options, TURP candidate vs medical therapy. Elects max medical therapy. PAST MEDICAL HISTORY: DM.  Hyperlipidemia. ED.  BPH. Morbid obesity. PAST SURGICAL HISTORY: Laparoscopic cholecystectomy 2020. Ankle fracture surgery. SOCIAL HISTORY:  and has 2 children. No smoking or illicit drug use. Social alcohol. Works in manufacturing. Reviewed past medical, surgical, family, and social history. Reviewed med list and allergies. REVIEW OF SYSTEMS:  Pertinent positives and negatives per HPI. A 10-point ROS was performed and is otherwise negative. EXAM:  BP (!) 147/93 (BP Location: Right arm, Patient Position: Sitting, Cuff Size: large)   Pulse 66     Physical Exam  Constitutional:       General: He is not in acute distress. Appearance: He is well-developed. HENT:      Head: Normocephalic. Eyes:      General: No scleral icterus. Cardiovascular:      Rate and Rhythm: Normal rate. Pulmonary:      Effort: Pulmonary effort is normal.   Skin:     General: Skin is warm and dry. Neurological:      Mental Status: He is alert and oriented to person, place, and time. Psychiatric:         Mood and Affect: Mood normal.         Behavior: Behavior normal.       PATHOLOGY:  No results found. LABS:      PSA Screen   Latest Ref Rng <=4.00 ng/mL   9/30/2020 1.76    8/19/2022 1.67        IMAGING:  No results found. UROLOGY PROCEDURE:    TRANSRECTAL ULTRASOUND OF THE PROSTATE  Transrectal ultrasonography of the prostate and seminal vesicles was performed in the transverse ad sagittal planes using a [employer] ultrasound System, and a 8 MHZ endorectal probe.  Representative pictures were taken of the prostate gland and these were preserved on hard copy. Pathology was noted. Prostate volume was determined by imaging the prostate in the transverse and sagittal planes and determining maximum height, width and length dimensions. FINDING:      Seminal vesicles: WNL   Hypoechoic prostate lesions suspicious for malignancy: None. Presence of intravesical median lobe: Mild. Prostate volume on ultrasound: 82.85 cc (W 5.95 cm x H 4.67 cm x L 5.70 cm)        CYSTOURETHROSCOPY  Informed consent was obtained for the procedure. The site was prepped and drape in the usual sterile fashion. A 16 [employer] flexible cystoscope was utilized. Anesthesia:  2% lidocaine gel     Urethra: Normal without strictures or masses. Prostate / Pelvic: Moderately enlarged with trilobar hypertrophy including kissing lateral lobes causing urethral coaptation and an enlarged median lobe with mild intravesical extension. Bladder: Normal without masses, tumors, stones, or lesions. U.O's: Normal and seen on retroflexion only. Trabeculation: +1-2     POST CYSTOSCOPY MEDICATIONS: sample one tablet Cipro 500 mg given to patient     DIAGNOSIS: Moderate BPH with evidence of bladder outlet obstruction. IMPRESSION:  59year old male h BPH and bothersome LUTS. 2 episodes of acute urinary retention (5/2021, 6/2023). Eventually passed voiding trial.    Currently on tamsulosin 0.4 mg daily which was started after most recent episode of retention. Further evaluation today performed to evaluate his candidacy for surgical treatment options for BPH as well as additional therapy. Discussed results with patient which confirmed moderate prostate enlargement with trilobar hypertrophy and bladder outlet obstruction-decreased urinary flow. Discussed management options of BPH with LUTS including medical therapy with alpha blockers, 5 alpha reductase inhibitors or combination therapy.   We also discussed minimally invasive surgical treatment options for BPH such as UroLift, TURP, Rezum, simple prostatectomy, greenlight PVP. We compared and contrasted rationale, approach, benefits, risks, side effects, and alternatives of treatment. Based on prostate size and anatomy, good candidate for TURP. At this point, patient seems to want to try maximal medical therapy before considering surgical treatment options for his BPH. Benefits, risks, side effects of 5 alpha reductase inhibitors was discussed. All questions answered. PLAN:  1. Continue Flomax 0.4 mg daily. 2. Start Finasteride 5 mg daily. RTC for follow-up in 3 months for updated IPSS score and bladder scan/PVR. Apart from performing cystoscopy, 20 additional mintues were spent on this visit with direct face-to-face discussion involving counseling, further management and treatment planning, reviewing testing and interpreting imaging results, ordering new testing, and documenting in patient's medical record.     Babs Acevedo MD  7/13/2023

## 2023-10-24 ENCOUNTER — PATIENT MESSAGE (OUTPATIENT)
Dept: FAMILY MEDICINE CLINIC | Facility: CLINIC | Age: 58
End: 2023-10-24

## 2023-10-25 ENCOUNTER — OFFICE VISIT (OUTPATIENT)
Dept: SURGERY | Facility: CLINIC | Age: 58
End: 2023-10-25

## 2023-10-25 DIAGNOSIS — Z12.5 PROSTATE CANCER SCREENING: ICD-10-CM

## 2023-10-25 DIAGNOSIS — R33.9 URINARY RETENTION: ICD-10-CM

## 2023-10-25 DIAGNOSIS — N40.1 BPH WITH OBSTRUCTION/LOWER URINARY TRACT SYMPTOMS: Primary | ICD-10-CM

## 2023-10-25 DIAGNOSIS — N13.8 BPH WITH OBSTRUCTION/LOWER URINARY TRACT SYMPTOMS: Primary | ICD-10-CM

## 2023-10-25 PROCEDURE — 99213 OFFICE O/P EST LOW 20 MIN: CPT | Performed by: UROLOGY

## 2023-10-25 RX ORDER — FINASTERIDE 5 MG/1
5 TABLET, FILM COATED ORAL DAILY
Qty: 90 TABLET | Refills: 3 | Status: SHIPPED | OUTPATIENT
Start: 2023-10-25

## 2023-10-25 RX ORDER — TAMSULOSIN HYDROCHLORIDE 0.4 MG/1
0.4 CAPSULE ORAL
Qty: 90 CAPSULE | Refills: 3 | Status: SHIPPED | OUTPATIENT
Start: 2023-10-25

## 2023-10-25 NOTE — PROGRESS NOTES
[facility] Urology  Follow-Up Visit    HPI: Shayne Wilkes is a 59year old male presents for a follow up visit. Patient was last seen on 7/13/23. Here by himself. INTERVAL HISTORY: Patient here for f/u on BPH with urinary retention. On daily Flomax 0.4 mg and finasteride 5 mg daily. IPSS today is 13 (2/3/4/2/2/4/2) with a quality-of-life score of 3. Reports improvement in his lower urinary tract symptoms on maximal medical therapy. No gross hematuria or dysuria. Bladder scan today reveals PVR of 100 mL. Due for PSA. 1. BPH with LUTS   2. Urinary Retention  Patient with known BPH. History of acute urinary retention 5/2021. CT 5/27/2021 revealing an enlarged prostate measuring 6.2 x 4.7 cm in axial dimension. Seen by [facility] (Dr. David Ying), patient passed voiding trial 6/1/2021. He was on Flomax transiently at the time however stopped the medication his symptoms are back to baseline. He was in [place] last week when he went into acute urinary retention again. He had a few alcoholic beverages after which she was unable to urinate. He was taken to the ER and a Brink catheter was inserted, reportedly draining about 1.5 L. He was restarted on Flomax. He was given a prescription for Cipro for possible UTI. No outside records available. He presents for voiding trial and further evaluation and management. He denies fevers or chills, gross hematuria or dysuria. His baseline IPSS is 18 (4/4/0/3/2/2/3) with a quality-of-life score of 4. He awakens about 3 times at night to urinate and has sensation of incomplete bladder emptying, frequency, weak stream and urgency. He has baseline erectile dysfunction. Takes vardenafil as needed with acceptable results. No family history of prostate cancer. Screening PSA level from 08/2022 normal at 1.67 ng/mL. - 6/2023 Consult: Patient passed voiding trial.  PVR 9 mL. Continue Flomax.   Interested in MIST's. - 7/2023 F/U: On Flomax daily.  - Uroflow + PVR: Voided 63.1 mL, Qmax 7.3 mL/s, Qave 5.4 mL/s, flow time 11.5 sec, voiding time 12.5 sec, voiding curve shape flattened with low peak, PVR 91 mL. - Prostate volume on TRUS: 82.85 cc.    - Cystoscopy revealing moderate BPH with trilobar hypertrophy including kissing lateral lobes and median lobe enlargement with mild intravesical extension. Discussed options, TURP candidate vs medical therapy. Elects max medical therapy. - 10/2023 follow-up: On Flomax and finasteride. IPSS 13 (2/3/4/2/2/4/2) with a quality-of-life score of 3.  mL. Elects continuing current management. PAST MEDICAL HISTORY: DM.  Hyperlipidemia. ED.  BPH. Morbid obesity. PAST SURGICAL HISTORY: Laparoscopic cholecystectomy 2020. Ankle fracture surgery. SOCIAL HISTORY:  and has 2 children. No smoking or illicit drug use. Social alcohol. Works in manufacturing. Reviewed past medical, surgical, family, and social history. Reviewed med list and allergies. REVIEW OF SYSTEMS:  Pertinent positives and negatives per HPI. A 10-point ROS was performed and is otherwise negative. EXAM:  There were no vitals taken for this visit. Physical Exam  Constitutional:       General: He is not in acute distress. Appearance: He is well-developed. HENT:      Head: Normocephalic. Eyes:      General: No scleral icterus. Cardiovascular:      Rate and Rhythm: Normal rate. Pulmonary:      Effort: Pulmonary effort is normal.   Skin:     General: Skin is warm and dry. Neurological:      Mental Status: He is alert and oriented to person, place, and time. Psychiatric:         Mood and Affect: Mood normal.         Behavior: Behavior normal.       PATHOLOGY:  No results found. LABS:      PSA Screen   Latest Ref Rng <=4.00 ng/mL   9/30/2020 1.76    8/19/2022 1.67        IMAGING:  No results found.       UROLOGY PROCEDURE:  None performed today.      IMPRESSION:  59year old male h BPH and bothersome LUTS. 2 episodes of acute urinary retention (5/2021, 6/2023). Eventually passed voiding trial.    Currently on tamsulosin 0.4 mg and finasteride 5 mg daily which was started after most recent episode of retention. Patient with moderate LUTS. No significant bother. Emptying bladder relatively well. Reviewed with patient. Management options discussed including continuing current management or considering minimally invasive surgical treatment options for BPH. Discussed rationale, approach, benefits, risk, side effects, and alternatives of treatment. Patient elects continuing current management. Discussed prostate cancer screening per guidelines. Due for PSA level. All questions answered. PLAN:  1. Continue Flomax 0.4 mg daily. 2. Continue finasteride 5 mg daily. 3. Screening PSA. Patient will be notified of the results. RTC for follow-up in 12 months for updated IPSS score and bladder scan/PVR.       Joann Stubbs MD  10/25/2023

## 2023-10-26 RX ORDER — TIRZEPATIDE 5 MG/.5ML
5 INJECTION, SOLUTION SUBCUTANEOUS
Qty: 6 ML | Refills: 1 | Status: SHIPPED | OUTPATIENT
Start: 2023-10-26 | End: 2024-01-24

## 2023-10-26 NOTE — TELEPHONE ENCOUNTER
Please review; protocol failed. No active /future labs noted   Message sent for patient to make an appointment. Requested Prescriptions   Pending Prescriptions Disp Refills    Tirzepatide (MOUNJARO) 5 MG/0.5ML Subcutaneous Solution Pen-injector 6 mL 1     Sig: Inject 5 mg into the skin every 7 days.        Diabetes Medication Protocol Failed - 10/26/2023 12:04 PM        Failed - Last A1C < 7.5 and within past 6 months     Lab Results   Component Value Date    A1C 7.1 (A) 2023             Failed - In person appointment or virtual visit in the past 6 mos or appointment in next 3 mos     Recent Outpatient Visits              Yesterday BPH with obstruction/lower urinary tract symptoms    Sergio Harp, Silas Headley MD    Office Visit    4 months ago Urinary retention    [address], [address] [facility], Silas Headley MD    Office Visit    6 months ago Type 2 diabetes mellitus without complication, without long-term current use of insulin [facility]    [address],[place] [address], [facility], [place] Rosemary Lay, PA-C    Office Visit    10 months ago Non-recurrent bilateral inguinal hernia without obstruction or gangrene    [address], [address] [facility],[address], Giovanna Mcmillan MD    Office Visit    10 months ago Essential hypertension    [address],[facility], [facility] [address], [place] Rosemary Lay, PA-C    Office Visit                      Passed - EGFRCR or GFRNAA > 50     GFR Evaluation  EGFRCR: 84 , resulted on 3/31/2023          Passed - GFR in the past 12 months           Recent Outpatient Visits              Yesterday BPH with obstruction/lower urinary tract symptoms    Axel Martínez MD    Office Visit    4 months ago Urinary retention    Axel Martínez MD    Office Visit    6 months ago Type 2 diabetes mellitus without complication, without long-term current use of insulin [facility])    [address], [address], [place], [place]    Office Visit    10 months ago Non-recurrent bilateral inguinal hernia without obstruction or gangrene    [facility], [address] [place] [address],[address], [place] Yessenia Santoyo MD    Office Visit    10 months ago Essential hypertension    [address], [address] [place] [address], [place], [place]    Office Visit

## 2023-10-26 NOTE — TELEPHONE ENCOUNTER
From: Martir Paredes  To: Gutierrez Dheeraj  Sent: 10/24/2023 7:56 PM CDT  Subject: Zelda Pickens    I am trying to [facility]. I got a message saying they need your permission. Have you received any communication from them? ?

## 2023-11-08 ENCOUNTER — TELEPHONE (OUTPATIENT)
Dept: SURGERY | Facility: CLINIC | Age: 58
End: 2023-11-08

## 2023-11-08 NOTE — TELEPHONE ENCOUNTER
Sanjay called to check on refill for rx. Tamsulosin 0.4 mg. Refill faxed to the office. Please send.

## 2023-11-09 NOTE — TELEPHONE ENCOUNTER
- s/w pharmacy tech; pt identity verified with name and   - I explained that an rx had been sent on 10/25/23 for a year's worth of Tamsulosin, and receipt had been confirmed by Sanjay. Tech reviewed pt file and did have rx. No further action required.   - encounter complete

## 2024-01-05 RX ORDER — ATORVASTATIN CALCIUM 10 MG/1
10 TABLET, FILM COATED ORAL DAILY
Qty: 90 TABLET | Refills: 1 | Status: SHIPPED | OUTPATIENT
Start: 2024-01-05

## 2024-01-05 NOTE — TELEPHONE ENCOUNTER
Refill passed per [facility] protocol.  Requested Prescriptions   Pending Prescriptions Disp Refills    atorvastatin (LIPITOR) 10 MG Oral Tab 90 tablet 3     Sig: Take 1 tablet (10 mg total) by mouth daily.       Cholesterol Medication Protocol Passed - 1/4/2024  8:51 AM        Passed - ALT in past 12 months        Passed - LDL in past 12 months        Passed - Last ALT < 80     Lab Results   Component Value Date    ALT 66 (H) 03/31/2023             Passed - Last LDL < 130     Lab Results   Component Value Date     (H) 03/31/2023             Passed - In person appointment or virtual visit in the past 12 mos or appointment in next 3 mos     Recent Outpatient Visits              2 months ago BPH with obstruction/lower urinary tract [facility]    [facility]    6 months ago [facility]inary retention    [facility]    Office Visit    9 months ago Type 2 diabetes mellitus without complication, without long-term current use of insulin ([facility])    [facility]    Office Visit    1 year ago Non-recurrent bilateral inguinal hernia without obstruction or gangrene    [facility]    [facility] Visit    1 year ago Essential hypertension    [facility] [place], Minhxuyen, PA-C    Office Visit                           Recent Outpatient Visits              2 months ago BPH with obstruction/lower urinary tract symptoms    [facility]    [facility] Visit    6 months ago Urinary retention    [facility]    [facility] Visit    9 months ago Type 2 diabetes mellitus without complication, without long-term current use of insulin ([facility])     [facility]    Office Visit    1 year ago Non-recurrent bilateral inguinal hernia without obstruction or gangrene    [facility] [place]    Office Visit    1 year ago Essential hypertension    [facility]    Office Visit

## 2024-02-09 DIAGNOSIS — R33.9 URINARY RETENTION: ICD-10-CM

## 2024-02-09 DIAGNOSIS — N13.8 BPH WITH OBSTRUCTION/LOWER URINARY TRACT SYMPTOMS: ICD-10-CM

## 2024-02-09 DIAGNOSIS — N40.1 BPH WITH OBSTRUCTION/LOWER URINARY TRACT SYMPTOMS: ICD-10-CM

## 2024-02-11 RX ORDER — FINASTERIDE 5 MG/1
5 TABLET, FILM COATED ORAL DAILY
Qty: 90 TABLET | Refills: 3 | OUTPATIENT
Start: 2024-02-11

## 2024-02-11 NOTE — TELEPHONE ENCOUNTER
One year refill for Finasteride sent to [facility] in [place] 10/25/23. [facility] sent to patient explaining this.    Refill denied.

## 2024-02-16 ENCOUNTER — OFFICE VISIT (OUTPATIENT)
Dept: FAMILY MEDICINE CLINIC | Facility: CLINIC | Age: 59
End: 2024-02-16
Payer: COMMERCIAL

## 2024-02-16 ENCOUNTER — LAB ENCOUNTER (OUTPATIENT)
Dept: LAB | Age: 59
End: 2024-02-16
Attending: PHYSICIAN ASSISTANT
Payer: COMMERCIAL

## 2024-02-16 VITALS
SYSTOLIC BLOOD PRESSURE: 130 MMHG | HEART RATE: 56 BPM | DIASTOLIC BLOOD PRESSURE: 87 MMHG | HEIGHT: 72 IN | WEIGHT: 247 LBS | BODY MASS INDEX: 33.46 KG/M2

## 2024-02-16 DIAGNOSIS — Z00.00 ROUTINE GENERAL MEDICAL EXAMINATION AT A HEALTH CARE FACILITY: Primary | ICD-10-CM

## 2024-02-16 DIAGNOSIS — E11.9 TYPE 2 DIABETES MELLITUS WITHOUT COMPLICATION, WITHOUT LONG-TERM CURRENT USE OF INSULIN (HCC): ICD-10-CM

## 2024-02-16 DIAGNOSIS — E66.09 CLASS 1 OBESITY DUE TO EXCESS CALORIES WITH SERIOUS COMORBIDITY AND BODY MASS INDEX (BMI) OF 33.0 TO 33.9 IN ADULT: ICD-10-CM

## 2024-02-16 DIAGNOSIS — Z12.5 PROSTATE CANCER SCREENING: ICD-10-CM

## 2024-02-16 DIAGNOSIS — E78.2 MIXED HYPERLIPIDEMIA: ICD-10-CM

## 2024-02-16 DIAGNOSIS — N13.8 BPH WITH OBSTRUCTION/LOWER URINARY TRACT SYMPTOMS: ICD-10-CM

## 2024-02-16 DIAGNOSIS — N40.1 BPH WITH OBSTRUCTION/LOWER URINARY TRACT SYMPTOMS: ICD-10-CM

## 2024-02-16 DIAGNOSIS — Z12.11 SCREENING FOR MALIGNANT NEOPLASM OF COLON: ICD-10-CM

## 2024-02-16 DIAGNOSIS — R33.9 URINARY RETENTION: ICD-10-CM

## 2024-02-16 DIAGNOSIS — Z00.00 ROUTINE GENERAL MEDICAL EXAMINATION AT A HEALTH CARE FACILITY: ICD-10-CM

## 2024-02-16 PROBLEM — E66.01 MORBIDLY OBESE (HCC): Status: RESOLVED | Noted: 2022-08-19 | Resolved: 2024-02-16

## 2024-02-16 PROBLEM — E66.811 CLASS 1 OBESITY DUE TO EXCESS CALORIES WITH SERIOUS COMORBIDITY AND BODY MASS INDEX (BMI) OF 33.0 TO 33.9 IN ADULT: Status: ACTIVE | Noted: 2024-02-16

## 2024-02-16 LAB
ALBUMIN SERPL-MCNC: 4.7 G/DL (ref 3.2–4.8)
ALBUMIN/GLOB SERPL: 1.6 {RATIO} (ref 1–2)
ALP LIVER SERPL-CCNC: 59 U/L
ALT SERPL-CCNC: 30 U/L
ANION GAP SERPL CALC-SCNC: 8 MMOL/L (ref 0–18)
AST SERPL-CCNC: 18 U/L (ref ?–34)
BASOPHILS # BLD AUTO: 0.03 X10(3) UL (ref 0–0.2)
BASOPHILS NFR BLD AUTO: 0.4 %
BILIRUB SERPL-MCNC: 0.7 MG/DL (ref 0.2–1.1)
BUN BLD-MCNC: 17 MG/DL (ref 9–23)
BUN/CREAT SERPL: 18.3 (ref 10–20)
CALCIUM BLD-MCNC: 9.8 MG/DL (ref 8.7–10.4)
CARTRIDGE LOT#: ABNORMAL NUMERIC
CHLORIDE SERPL-SCNC: 109 MMOL/L (ref 98–112)
CHOLEST SERPL-MCNC: 146 MG/DL (ref ?–200)
CO2 SERPL-SCNC: 27 MMOL/L (ref 21–32)
COMPLEXED PSA SERPL-MCNC: 0.67 NG/ML (ref ?–4)
CREAT BLD-MCNC: 0.93 MG/DL
CREAT UR-SCNC: 131.8 MG/DL
DEPRECATED RDW RBC AUTO: 42.9 FL (ref 35.1–46.3)
EGFRCR SERPLBLD CKD-EPI 2021: 94 ML/MIN/1.73M2 (ref 60–?)
EOSINOPHIL # BLD AUTO: 0.05 X10(3) UL (ref 0–0.7)
EOSINOPHIL NFR BLD AUTO: 0.7 %
ERYTHROCYTE [DISTWIDTH] IN BLOOD BY AUTOMATED COUNT: 12.9 % (ref 11–15)
FASTING PATIENT LIPID ANSWER: YES
FASTING STATUS PATIENT QL REPORTED: YES
GLOBULIN PLAS-MCNC: 2.9 G/DL (ref 2.8–4.4)
GLUCOSE BLD-MCNC: 109 MG/DL (ref 70–99)
HCT VFR BLD AUTO: 48.6 %
HDLC SERPL-MCNC: 49 MG/DL (ref 40–59)
HEMOGLOBIN A1C: 5.7 % (ref 4.3–5.6)
HGB BLD-MCNC: 17.5 G/DL
IMM GRANULOCYTES # BLD AUTO: 0.02 X10(3) UL (ref 0–1)
IMM GRANULOCYTES NFR BLD: 0.3 %
LDLC SERPL CALC-MCNC: 81 MG/DL (ref ?–100)
LYMPHOCYTES # BLD AUTO: 1.68 X10(3) UL (ref 1–4)
LYMPHOCYTES NFR BLD AUTO: 25 %
MCH RBC QN AUTO: 32.8 PG (ref 26–34)
MCHC RBC AUTO-ENTMCNC: 36 G/DL (ref 31–37)
MCV RBC AUTO: 91.2 FL
MICROALBUMIN UR-MCNC: <0.3 MG/DL
MONOCYTES # BLD AUTO: 0.43 X10(3) UL (ref 0.1–1)
MONOCYTES NFR BLD AUTO: 6.4 %
NEUTROPHILS # BLD AUTO: 4.51 X10 (3) UL (ref 1.5–7.7)
NEUTROPHILS # BLD AUTO: 4.51 X10(3) UL (ref 1.5–7.7)
NEUTROPHILS NFR BLD AUTO: 67.2 %
NONHDLC SERPL-MCNC: 97 MG/DL (ref ?–130)
OSMOLALITY SERPL CALC.SUM OF ELEC: 300 MOSM/KG (ref 275–295)
PLATELET # BLD AUTO: 202 10(3)UL (ref 150–450)
POTASSIUM SERPL-SCNC: 4.6 MMOL/L (ref 3.5–5.1)
PROT SERPL-MCNC: 7.6 G/DL (ref 5.7–8.2)
RBC # BLD AUTO: 5.33 X10(6)UL
SODIUM SERPL-SCNC: 144 MMOL/L (ref 136–145)
TRIGL SERPL-MCNC: 85 MG/DL (ref 30–149)
TSI SER-ACNC: 1.25 MIU/ML (ref 0.55–4.78)
VIT B12 SERPL-MCNC: 378 PG/ML (ref 211–911)
VLDLC SERPL CALC-MCNC: 13 MG/DL (ref 0–30)
WBC # BLD AUTO: 6.7 X10(3) UL (ref 4–11)

## 2024-02-16 PROCEDURE — 84443 ASSAY THYROID STIM HORMONE: CPT

## 2024-02-16 PROCEDURE — 85025 COMPLETE CBC W/AUTO DIFF WBC: CPT

## 2024-02-16 PROCEDURE — 80053 COMPREHEN METABOLIC PANEL: CPT

## 2024-02-16 PROCEDURE — 82570 ASSAY OF URINE CREATININE: CPT

## 2024-02-16 PROCEDURE — 82043 UR ALBUMIN QUANTITATIVE: CPT

## 2024-02-16 PROCEDURE — 80061 LIPID PANEL: CPT

## 2024-02-16 PROCEDURE — 36415 COLL VENOUS BLD VENIPUNCTURE: CPT

## 2024-02-16 PROCEDURE — 82607 VITAMIN B-12: CPT

## 2024-02-16 RX ORDER — TIRZEPATIDE 7.5 MG/.5ML
7.5 INJECTION, SOLUTION SUBCUTANEOUS
Qty: 6 ML | Refills: 3 | Status: SHIPPED | OUTPATIENT
Start: 2024-02-16 | End: 2024-05-16

## 2024-02-16 RX ORDER — FINASTERIDE 5 MG/1
5 TABLET, FILM COATED ORAL DAILY
Qty: 90 TABLET | Refills: 3 | Status: SHIPPED | OUTPATIENT
Start: 2024-02-16

## 2024-02-16 RX ORDER — TAMSULOSIN HYDROCHLORIDE 0.4 MG/1
0.4 CAPSULE ORAL
Qty: 90 CAPSULE | Refills: 3 | Status: SHIPPED | OUTPATIENT
Start: 2024-02-16

## 2024-02-16 RX ORDER — ATORVASTATIN CALCIUM 10 MG/1
10 TABLET, FILM COATED ORAL DAILY
Qty: 90 TABLET | Refills: 3 | Status: SHIPPED | OUTPATIENT
Start: 2024-02-16

## 2024-02-16 RX ORDER — TIRZEPATIDE 5 MG/.5ML
INJECTION, SOLUTION SUBCUTANEOUS
COMMUNITY
Start: 2024-01-24 | End: 2024-02-16

## 2024-02-16 NOTE — PROGRESS NOTES
HPI:   Gabriel Stewart is a 58 year old male who presents for an Annual Health Visit.   Patient is doing fine at this time. Patient denies of chest pain, SOB, N/V/C/D, fever, dizziness, syncope, abdominal pain. There are no other concerns today.    Allergies:     Allergies   Allergen Reactions    Lisinopril Coughing       CURRENT MEDICATIONS   Current Outpatient Medications   Medication Sig Dispense Refill    atorvastatin (LIPITOR) 10 MG Oral Tab Take 1 tablet (10 mg total) by mouth daily. 90 tablet 3    finasteride 5 MG Oral Tab Take 1 tablet (5 mg total) by mouth daily. 90 tablet 3    tamsulosin 0.4 MG Oral Cap Take 1 capsule (0.4 mg total) by mouth daily with dinner. 90 capsule 3    Tirzepatide (MOUNJARO) 7.5 MG/0.5ML Subcutaneous Solution Pen-injector Inject 7.5 mg into the skin every 7 days. 6 mL 3      HISTORICAL INFORMATION   History reviewed. No pertinent past medical history.   Past Surgical History:   Procedure Laterality Date    ANKLE FRACTURE SURGERY      CHOLECYSTECTOMY      LAPAROSCOPIC CHOLECYSTECTOMY  2020      Family History   Problem Relation Age of Onset    Gastro-Intestinal Disorder Father       SOCIAL HISTORY   Social History     Socioeconomic History    Marital status:    Tobacco Use    Smoking status: Never    Smokeless tobacco: Never   Vaping Use    Vaping Use: Never used   Substance and Sexual Activity    Alcohol use: Not Currently     Alcohol/week: 0.0 standard drinks of alcohol    Drug use: Never     Social History     Social History Narrative    Not on file        REVIEW OF SYSTEMS:     Review of Systems   Constitutional: Negative.    HENT: Negative.     Eyes: Negative.    Respiratory: Negative.     Cardiovascular: Negative.    Gastrointestinal: Negative.    Genitourinary: Negative.    Musculoskeletal: Negative.    Skin: Negative.    Neurological: Negative.    Psychiatric/Behavioral: Negative.           EXAM:   /87   Pulse 56   Ht 6' (1.829 m)   Wt 247 lb (112 kg)   BMI  33.50 kg/m²    Wt Readings from Last 6 Encounters:   02/16/24 247 lb (112 kg)   03/31/23 265 lb (120.2 kg)   12/27/22 268 lb (121.6 kg)   12/08/22 268 lb (121.6 kg)   08/19/22 282 lb (127.9 kg)   06/25/21 265 lb (120.2 kg)     Body mass index is 33.5 kg/m².    Physical Exam  Vitals reviewed.   Constitutional:       Appearance: He is well-developed.   HENT:      Head: Normocephalic and atraumatic.      Right Ear: Tympanic membrane, ear canal and external ear normal. There is no impacted cerumen.      Left Ear: Tympanic membrane, ear canal and external ear normal. There is no impacted cerumen.      Nose: Nose normal.      Mouth/Throat:      Mouth: Mucous membranes are moist.      Pharynx: Oropharynx is clear. No oropharyngeal exudate or posterior oropharyngeal erythema.   Eyes:      General:         Right eye: No discharge.         Left eye: No discharge.      Conjunctiva/sclera: Conjunctivae normal.   Cardiovascular:      Rate and Rhythm: Normal rate and regular rhythm.      Heart sounds: Normal heart sounds.   Pulmonary:      Effort: Pulmonary effort is normal.      Breath sounds: Normal breath sounds.   Abdominal:      General: Abdomen is flat. Bowel sounds are normal. There is no distension.      Palpations: Abdomen is soft.      Tenderness: There is no abdominal tenderness. There is no right CVA tenderness or left CVA tenderness.   Musculoskeletal:         General: Normal range of motion.      Cervical back: Normal range of motion and neck supple.   Skin:     Findings: No rash.   Neurological:      Mental Status: He is alert and oriented to person, place, and time.   Psychiatric:         Mood and Affect: Mood normal.         Behavior: Behavior normal.         Thought Content: Thought content normal.         Judgment: Judgment normal.          ASSESSMENT AND PLAN:   Gabriel was seen today for routine physical.    Diagnoses and all orders for this visit:    Routine general medical examination at a [facility]  facility  -     CBC With Differential With Platelet; Future  -     Comp Metabolic Panel (14); Future  -     Lipid Panel; Future  -     TSH W Reflex To Free T4; Future  -     Vitamin B12; Future  Overall health discussed, exercise/activity appropriate for age and health status, heathy diety, preventive care, and upcoming screening discussed. Routine labs ordered.      Type 2 diabetes mellitus without complication, without long-term current use of insulin (HCC)  -     POC Glycohemoglobin [45127]  -     CBC With Differential With Platelet; Future  -     Comp Metabolic Panel (14); Future  -     Lipid Panel; Future  -     TSH W Reflex To Free T4; Future  -     Vitamin B12; Future  -     Microalb/Creat Ratio, Random Urine; Future  -     Tirzepatide (MOUNJARO) 7.5 MG/0.5ML Subcutaneous Solution Pen-injector; Inject 7.5 mg into the skin every 7 days.  -     CT CALCIUM SCORING; Future  Increase Mounjaro 7.5 mg Qweek.    Bilateral barefoot skin diabetic exam is normal, visualized feet and the appearance is normal.  Bilateral monofilament/sensation of both feet is normal.  Pulsation pedal pulse exam of both lower legs/feet is normal as well.      Screening for malignant neoplasm of colon  -     Gastro GI Telephone Colon Screen; Future    BPH with obstruction/lower urinary tract symptoms  -     finasteride 5 MG Oral Tab; Take 1 tablet (5 mg total) by mouth daily.  -     tamsulosin 0.4 MG Oral Cap; Take 1 capsule (0.4 mg total) by mouth daily with dinner.    Urinary retention  -     finasteride 5 MG Oral Tab; Take 1 tablet (5 mg total) by mouth daily.    Class 1 obesity due to excess calories with serious comorbidity and body mass index (BMI) of 33.0 to 33.9 in adult  -     CBC With Differential With Platelet; Future  -     Comp Metabolic Panel (14); Future  -     Lipid Panel; Future  -     TSH W Reflex To Free T4; Future  -     Vitamin B12; Future    Mixed hyperlipidemia  -     atorvastatin (LIPITOR) 10 MG Oral Tab; Take 1  tablet (10 mg total) by mouth daily.    There are no Patient Instructions on file for this visit.    The patient indicates understanding of these issues and agrees to the plan.    Problem List:  Patient Active Problem List   Diagnosis    Calculus of gallbladder with acute on chronic cholecystitis with obstruction    Elevated liver enzymes    Enlarged prostate with urinary obstruction    Essential hypertension    Non-recurrent unilateral inguinal hernia without obstruction or gangrene    Umbilical hernia without obstruction and without gangrene    Type 2 diabetes mellitus without complication, without long-term current use of insulin (HCC)    Class 1 obesity due to excess calories with serious comorbidity and body mass index (BMI) of 33.0 to 33.9 in adult    Mixed hyperlipidemia       Di Drake PA-C  2/16/2024  10:16 AM

## 2024-02-21 NOTE — TELEPHONE ENCOUNTER
atorvastatin (LIPITOR) 10 MG Oral Tab, Take 1 tablet (10 mg total) by mouth daily., Disp: 90 tablet, Rfl: 3           No (0)

## 2024-02-23 DIAGNOSIS — E11.9 TYPE 2 DIABETES MELLITUS WITHOUT COMPLICATION, WITHOUT LONG-TERM CURRENT USE OF INSULIN (HCC): ICD-10-CM

## 2024-02-24 RX ORDER — TIRZEPATIDE 7.5 MG/.5ML
7.5 INJECTION, SOLUTION SUBCUTANEOUS
Qty: 6 ML | Refills: 3 | Status: SHIPPED | OUTPATIENT
Start: 2024-02-24 | End: 2024-05-24

## 2024-02-24 NOTE — TELEPHONE ENCOUNTER
Refill passed per [facility] protocol. - new pharmacy   Requested Prescriptions   Pending Prescriptions Disp Refills    Tirzepatide (MOUNJARO) 7.5 MG/0.5ML Subcutaneous Solution Pen-injector 6 mL 3     Sig: Inject 7.5 mg into the skin every 7 days.       Diabetes Medication Protocol Passed - 2/23/2024 11:48 AM        Passed - Last A1C < 7.5 and within past 6 months     Lab Results   Component Value Date    A1C 5.7 (A) 02/16/2024             Passed - In person appointment or virtual visit in the past 6 mos or appointment in next 3 mos     Recent Outpatient Visits              1 week ago Routine general medical examination at a health care facility    [facility] [place], Minhxuyen, PA-C    Office Visit    4 months ago B[facility] obstruction[facility] urinary tract [facility]    [facility] Visit    8 months ago Urinary retention    [facility]    Office Visit    11 months ago Type 2 diabetes mellitus without complication, without long-term current use of insulin ([facility])    [facility] [place] [facility], Minhxuyen, PA-C    Office Visit    1 year ago Non-recurrent bilateral inguinal hernia without obstruction or gangrene    [facility]    [facility] Visit                      Passed - Microalbumin procedure in past 12 months or taking ACE/ARB        Passed - EGFRCR or GFRNAA > 50     GFR Evaluation  EGFRCR: 94 , resulted on 2/16/2024          Passed - GFR in the past 12 months

## 2024-02-26 ENCOUNTER — PATIENT MESSAGE (OUTPATIENT)
Dept: FAMILY MEDICINE CLINIC | Facility: CLINIC | Age: 59
End: 2024-02-26

## 2024-02-26 DIAGNOSIS — R33.9 URINARY RETENTION: ICD-10-CM

## 2024-02-26 DIAGNOSIS — N40.1 BPH WITH OBSTRUCTION/LOWER URINARY TRACT SYMPTOMS: ICD-10-CM

## 2024-02-26 DIAGNOSIS — N13.8 BPH WITH OBSTRUCTION/LOWER URINARY TRACT SYMPTOMS: ICD-10-CM

## 2024-02-27 RX ORDER — FINASTERIDE 5 MG/1
5 TABLET, FILM COATED ORAL DAILY
Qty: 90 TABLET | Refills: 3 | Status: SHIPPED | OUTPATIENT
Start: 2024-02-27

## 2024-02-27 RX ORDER — TAMSULOSIN HYDROCHLORIDE 0.4 MG/1
0.4 CAPSULE ORAL
Qty: 90 CAPSULE | Refills: 3 | Status: SHIPPED | OUTPATIENT
Start: 2024-02-27

## 2024-02-29 ENCOUNTER — PATIENT MESSAGE (OUTPATIENT)
Dept: FAMILY MEDICINE CLINIC | Facility: CLINIC | Age: 59
End: 2024-02-29

## 2024-03-01 NOTE — TELEPHONE ENCOUNTER
From: Gabriel Stewart  To: Di Drake  Sent: 2/29/2024 2:21 PM CST  Subject: Shingles vaccine     Just wanted to update you on my shingles vaccine. I got first shot today 2/29/24

## 2024-05-13 ENCOUNTER — OFFICE VISIT (OUTPATIENT)
Dept: SURGERY | Facility: CLINIC | Age: 59
End: 2024-05-13
Payer: COMMERCIAL

## 2024-05-13 DIAGNOSIS — N13.8 BPH WITH OBSTRUCTION/LOWER URINARY TRACT SYMPTOMS: Primary | ICD-10-CM

## 2024-05-13 DIAGNOSIS — Z12.5 PROSTATE CANCER SCREENING: ICD-10-CM

## 2024-05-13 DIAGNOSIS — N40.1 BPH WITH OBSTRUCTION/LOWER URINARY TRACT SYMPTOMS: Primary | ICD-10-CM

## 2024-05-13 DIAGNOSIS — R33.9 URINARY RETENTION: ICD-10-CM

## 2024-05-13 PROCEDURE — 99213 OFFICE O/P EST LOW 20 MIN: CPT | Performed by: UROLOGY

## 2024-05-13 NOTE — PROGRESS NOTES
[facility]  Follow-Up Visit    HPI: Gabriel Stewart is a 59 year old male presents for a follow up visit. Patient was last seen on 10/2023. Here with his wife.    INTERVAL HISTORY: Patient here for f/u on BPH with urinary retention.     On daily Flomax 0.4 mg and finasteride 5 mg daily.    IPSS today is 5 (1/2/1/0/1/0/0) with a quality-of-life score of 3.    Reports improvement in his lower urinary tract symptoms on maximal medical therapy.    No gross hematuria or dysuria.    Bladder scan today reveals PVR of 0 mL.    PSA 2/2024 0.67 (corrects at 1.34).      1. BPH with LUTS   2. Urinary Retention  Patient with known BPH.  History of acute urinary retention 5/2021.  CT 5/27/2021 revealing an enlarged prostate measuring 6.2 x 4.7 cm in axial dimension.     Seen by [facility] urology (Dr. West), patient passed voiding trial 6/1/2021.     He was on Flomax transiently at the time however stopped the medication his symptoms are back to baseline.     He was in [place] last week when he went into acute urinary retention again.  He had a few alcoholic beverages after which she was unable to urinate.  He was taken to the ER and a Brink catheter was inserted, reportedly draining about 1.5 L.     He was restarted on Flomax.  He was given a prescription for Cipro for possible UTI.  No outside records available.     He presents for voiding trial and further evaluation and management.     He denies fevers or chills, gross hematuria or dysuria.     His baseline IPSS is 18 (4/4/0/3/2/2/3) with a quality-of-life score of 4.  He awakens about 3 times at night to urinate and has sensation of incomplete bladder emptying, frequency, weak stream and urgency.     He has baseline erectile dysfunction.  Takes vardenafil as needed with acceptable results.     No family history of prostate cancer.  Screening PSA level from 08/2022 normal at 1.67 ng/mL.     - 6/2023 Consult: Patient passed voiding trial.  PVR 9 mL.  Continue Flomax.  Interested in  MIST's.     - 7/2023 F/U: On Flomax daily.  - Uroflow + PVR: Voided 63.1 mL, Qmax 7.3 mL/s, Qave 5.4 mL/s, flow time 11.5 sec, voiding time 12.5 sec, voiding curve shape flattened with low peak, PVR 91 mL.      - Prostate volume on TRUS: 82.85 cc.    - Cystoscopy revealing moderate BPH with trilobar hypertrophy including kissing lateral lobes and median lobe enlargement with mild intravesical extension. Discussed options, TURP candidate vs medical therapy. Elects max medical therapy.      - 10/2023 follow-up: On Flomax and finasteride.  IPSS 13 (2/3/4/2/2/4/2) with a quality-of-life score of 3.   mL.  Elects continuing current management.    - 5/2024 FU: On Flomax and finasteride.  IPSS 5 with a quality-of-life score of 3.  PVR 0 mL.  CCM.      PAST MEDICAL HISTORY: DM.  Hyperlipidemia.  ED.  BPH.  Morbid obesity.     PAST SURGICAL HISTORY: Laparoscopic cholecystectomy 2020.  Ankle fracture surgery.     SOCIAL HISTORY:  and has 2 children.  No smoking or illicit drug use.  Social alcohol.  Works in manufacturing.     Reviewed past medical, surgical, family, and social history.  Reviewed med list and allergies.      REVIEW OF SYSTEMS:  Pertinent positives and negatives per HPI. A 10-point ROS was performed and is otherwise negative.       EXAM:  There were no vitals taken for this visit.    Physical Exam  Constitutional:       General: He is not in acute distress.     Appearance: He is well-developed.   HENT:      Head: Normocephalic.   Eyes:      General: No scleral icterus.  Cardiovascular:      Rate and Rhythm: Normal rate.   Pulmonary:      Effort: Pulmonary effort is normal.   Skin:     General: Skin is warm and dry.   Neurological:      Mental Status: He is alert and oriented to person, place, and time.   Psychiatric:         Mood and Affect: Mood normal.         Behavior: Behavior normal.       PATHOLOGY:  No results found.       LABS:      PSA Screen   Latest Ref Rng <=4.00 ng/mL   9/30/2020  1.76    8/19/2022 1.67    2/2024 0.67       IMAGING:  No results found.      UROLOGY PROCEDURE:  None performed today.      IMPRESSION:  59 year old male h BPH and bothersome LUTS.     2 episodes of acute urinary retention (5/2021, 6/2023).      Eventually passed voiding trial.    Currently on tamsulosin 0.4 mg and finasteride 5 mg daily which was started after most recent episode of retention.    Patient with mild LUTS.  No significant bother.      Emptying bladder relatively well.      Reviewed with patient.  Management options discussed including continuing current management or considering minimally invasive surgical treatment options for BPH.    Discussed rationale, approach, benefits, risk, side effects, and alternatives of treatment.  Patient elects continuing current management.    He is going [place] in the summer and is concerned about the risk of urinary retention.  We discussed that combination therapy reduces the risk of urinary retention.  We offered him the option of teaching him self CIC in case he needs it however he politely declined.    Discussed prostate cancer screening per guidelines.  Continue routine screening    All questions answered.      PLAN:  1. Continue Flomax 0.4 mg daily.  2. Continue finasteride 5 mg daily.  3. Continue routine PSA screening.  Next PSA February 2025.A.  Patient will be notified of the results.    RTC for follow-up in 12 months for updated IPSS score and bladder scan/PVR.      Nader Gomez MD  5/13/2024

## 2024-06-10 DIAGNOSIS — E78.2 MIXED HYPERLIPIDEMIA: ICD-10-CM

## 2024-06-13 RX ORDER — ATORVASTATIN CALCIUM 10 MG/1
10 TABLET, FILM COATED ORAL DAILY
Qty: 90 TABLET | Refills: 3 | Status: SHIPPED | OUTPATIENT
Start: 2024-06-13

## 2024-06-13 NOTE — TELEPHONE ENCOUNTER
Refill Per Protocol     Requested Prescriptions   Pending Prescriptions Disp Refills    ATORVASTATIN 10 MG Oral Tab [Pharmacy Med Name: ATORVASTATIN TABS 10MG] 90 tablet 3     Sig: TAKE 1 TABLET DAILY       Cholesterol Medication Protocol Passed - 6/10/2024 12:35 AM        Passed - ALT < 80     Lab Results   Component Value Date    ALT 30 02/16/2024             Passed - ALT resulted within past year        Passed - Lipid panel within past 12 months     Lab Results   Component Value Date    CHOLEST 146 02/16/2024    TRIG 85 02/16/2024    HDL 49 02/16/2024    LDL 81 02/16/2024    VLDL 13 02/16/2024    NONHDLC 97 02/16/2024             Passed - In person appointment or virtual visit in the past 12 mos or appointment in next 3 mos     Recent Outpatient Visits              1 month ago [facility] urinary tract [facility]    [facility] Visit    3 months ago Routine general medical examination at a health care facility    [facility] [place] Nguyen, Minhxuyen, PA-C    Office Visit    7 months ago BPH with obstruction/lower urinary tract symptoms    [facility]    [facility] Visit    12 months ago Urinary retention    [facility]    Office Visit    1 year ago Type 2 diabetes mellitus without complication, without long-term current use of insulin ([facility])    [facility] [place] Nguyen, Minhxuyen, PA-C    Office Visit                               Recent Outpatient Visits              1 month ago BPH with obstruction/lower urinary tract symptoms    [facility]    [facility] Visit    3 months ago Routine general medical examination at a health care facility    [facility],  [place] Nguyen, Minhxuyen, PA-C    Office Visit    7 months ago BPH with obstruction/lower urinary tract symptoms    [facility], Nader Randolph MD    Office Visit    12 months ago Urinary retention    [facility], Nader Randolph MD    Office Visit    1 year ago Type 2 diabetes mellitus without complication, without long-term current use of insulin (HCC)    [facility] [place] Nguyen, Minhxuyen, PA-C    Office Visit

## 2024-07-23 ENCOUNTER — PATIENT MESSAGE (OUTPATIENT)
Dept: FAMILY MEDICINE CLINIC | Facility: CLINIC | Age: 59
End: 2024-07-23

## 2024-08-08 ENCOUNTER — TELEPHONE (OUTPATIENT)
Dept: FAMILY MEDICINE CLINIC | Facility: CLINIC | Age: 59
End: 2024-08-08

## 2024-08-08 DIAGNOSIS — E11.9 TYPE 2 DIABETES MELLITUS WITHOUT COMPLICATION, WITHOUT LONG-TERM CURRENT USE OF INSULIN (HCC): ICD-10-CM

## 2024-08-08 RX ORDER — TIRZEPATIDE 7.5 MG/.5ML
7.5 INJECTION, SOLUTION SUBCUTANEOUS
Qty: 6 ML | Refills: 3 | Status: SHIPPED | OUTPATIENT
Start: 2024-08-08 | End: 2024-11-06

## 2024-08-08 NOTE — TELEPHONE ENCOUNTER
Approved    Prior authorization approved  Payer: [employer] Case ID: [id number]    [phone]    [phone]  Note from payer: CaseId:[id number];Status:Approved;Review Type:Prior Auth;Coverage Start Date:08/08/2024;Coverage End Date:08/08/2025;  Approval Details    Authorized from August 8, 2024 to August 8, 2025  Electronic appeal: Not supported  View History

## 2025-01-23 ENCOUNTER — TELEPHONE (OUTPATIENT)
Dept: FAMILY MEDICINE CLINIC | Facility: CLINIC | Age: 60
End: 2025-01-23

## 2025-01-23 DIAGNOSIS — E11.9 TYPE 2 DIABETES MELLITUS WITHOUT COMPLICATION, WITHOUT LONG-TERM CURRENT USE OF INSULIN (HCC): ICD-10-CM

## 2025-01-26 ENCOUNTER — PATIENT MESSAGE (OUTPATIENT)
Dept: FAMILY MEDICINE CLINIC | Facility: CLINIC | Age: 60
End: 2025-01-26

## 2025-01-27 ENCOUNTER — ORDER TRANSCRIPTION (OUTPATIENT)
Dept: ADMINISTRATIVE | Facility: HOSPITAL | Age: 60
End: 2025-01-27

## 2025-01-27 DIAGNOSIS — E11.9 TYPE 2 DIABETES MELLITUS WITHOUT COMPLICATION, WITHOUT LONG-TERM CURRENT USE OF INSULIN (HCC): Primary | ICD-10-CM

## 2025-01-28 RX ORDER — TIRZEPATIDE 7.5 MG/.5ML
7.5 INJECTION, SOLUTION SUBCUTANEOUS
Qty: 6 ML | Refills: 3 | OUTPATIENT
Start: 2025-01-28

## 2025-01-28 RX ORDER — TIRZEPATIDE 7.5 MG/.5ML
7.5 INJECTION, SOLUTION SUBCUTANEOUS WEEKLY
Qty: 6 ML | Refills: 0 | Status: SHIPPED | OUTPATIENT
Start: 2025-01-28 | End: 2025-01-30

## 2025-01-28 NOTE — TELEPHONE ENCOUNTER
Patient requesting pharmacy change to [facility].    Sending 1 fill, appointment needed.   Outpatient Medication Detail   Disp Refills Start End    Tirzepatide (MOUNJARO) 7.5 MG/0.5ML Subcutaneous Solution Pen-injector  6 mL 3 8/8/2024     Sig - Route: Inject 7.5 mg into the skin every 7 days. - Subcutaneous    Sent to pharmacy as: Mounjaro 7.5 MG/0.5ML Subcutaneous Solution Pen-injector (Tirzepatide)    E-Prescribing Status: Receipt confirmed by pharmacy (8/8/2024 12:25 PM CDT)    Prior authorization: Approved    Associated Diagnoses  Type 2 diabetes mellitus without complication, without long-term current use of insulin ([facility])      Pharmacy  [id number] - [place], [place] - [address] AT [facility], [phone], [phone]

## 2025-02-04 ENCOUNTER — HOSPITAL ENCOUNTER (OUTPATIENT)
Dept: CT IMAGING | Age: 60
Discharge: HOME | End: 2025-02-04
Attending: FAMILY MEDICINE

## 2025-02-04 DIAGNOSIS — Z13.6 SCREENING FOR HEART DISEASE: ICD-10-CM

## 2025-02-14 ENCOUNTER — OFFICE VISIT (OUTPATIENT)
Dept: FAMILY MEDICINE CLINIC | Facility: CLINIC | Age: 60
End: 2025-02-14
Payer: COMMERCIAL

## 2025-02-14 ENCOUNTER — TELEPHONE (OUTPATIENT)
Dept: FAMILY MEDICINE CLINIC | Facility: CLINIC | Age: 60
End: 2025-02-14

## 2025-02-14 VITALS
DIASTOLIC BLOOD PRESSURE: 80 MMHG | BODY MASS INDEX: 32.64 KG/M2 | SYSTOLIC BLOOD PRESSURE: 118 MMHG | HEIGHT: 72 IN | WEIGHT: 241 LBS | HEART RATE: 73 BPM

## 2025-02-14 DIAGNOSIS — Z00.00 ROUTINE GENERAL MEDICAL EXAMINATION AT A HEALTH CARE FACILITY: Primary | ICD-10-CM

## 2025-02-14 DIAGNOSIS — E78.2 MIXED HYPERLIPIDEMIA: ICD-10-CM

## 2025-02-14 DIAGNOSIS — I10 ESSENTIAL HYPERTENSION: ICD-10-CM

## 2025-02-14 DIAGNOSIS — N40.0 BENIGN PROSTATIC HYPERPLASIA WITHOUT LOWER URINARY TRACT SYMPTOMS: ICD-10-CM

## 2025-02-14 DIAGNOSIS — Z23 NEED FOR VACCINATION: ICD-10-CM

## 2025-02-14 DIAGNOSIS — E11.9 TYPE 2 DIABETES MELLITUS WITHOUT COMPLICATION, WITHOUT LONG-TERM CURRENT USE OF INSULIN (HCC): ICD-10-CM

## 2025-02-14 PROBLEM — E66.811 CLASS 1 OBESITY DUE TO EXCESS CALORIES WITH SERIOUS COMORBIDITY AND BODY MASS INDEX (BMI) OF 33.0 TO 33.9 IN ADULT: Status: RESOLVED | Noted: 2024-02-16 | Resolved: 2025-02-14

## 2025-02-14 PROBLEM — E66.09 CLASS 1 OBESITY DUE TO EXCESS CALORIES WITH SERIOUS COMORBIDITY AND BODY MASS INDEX (BMI) OF 33.0 TO 33.9 IN ADULT: Status: RESOLVED | Noted: 2024-02-16 | Resolved: 2025-02-14

## 2025-02-14 PROCEDURE — 90471 IMMUNIZATION ADMIN: CPT | Performed by: PHYSICIAN ASSISTANT

## 2025-02-14 PROCEDURE — 90677 PCV20 VACCINE IM: CPT | Performed by: PHYSICIAN ASSISTANT

## 2025-02-14 PROCEDURE — 99396 PREV VISIT EST AGE 40-64: CPT | Performed by: PHYSICIAN ASSISTANT

## 2025-02-14 RX ORDER — LOSARTAN POTASSIUM 25 MG/1
25 TABLET ORAL DAILY
Qty: 90 TABLET | Refills: 3 | Status: SHIPPED | OUTPATIENT
Start: 2025-02-14 | End: 2025-02-14

## 2025-02-14 NOTE — PROGRESS NOTES
HPI:     HPI  Shinge  5/16/2024  Medications:     Current Outpatient Medications   Medication Sig Dispense Refill    Tirzepatide (MOUNJARO) 7.5 MG/0.5ML Subcutaneous Solution Auto-injector Inject 7.5 mg into the skin once a week. 2 mL 0    atorvastatin 10 MG Oral Tab Take 1 tablet (10 mg total) by mouth daily. 90 tablet 3    finasteride 5 MG Oral Tab Take 1 tablet (5 mg total) by mouth daily. 90 tablet 3    tamsulosin 0.4 MG Oral Cap Take 1 capsule (0.4 mg total) by mouth daily with dinner. 90 capsule 3       Allergies:   Allergies[1]    History:     Health Maintenance   Topic Date Due    Pneumococcal Vaccine: 50+ Years (1 of 2 - PCV) Never done    Colorectal Cancer Screening  09/30/2021    Zoster Vaccines (2 of 2) 04/25/2024    Diabetes Care A1C  08/16/2024    COVID-19 Vaccine (4 - 2024-25 season) 09/01/2024    Influenza Vaccine (1) 10/01/2024    Annual Depression Screening  01/01/2025    Diabetes Care: Foot Exam (Annual)  01/01/2025    Diabetes Care: Microalb/Creat Ratio (Annual)  01/01/2025    Diabetes Care Dilated Eye Exam  01/11/2025    Annual Physical  02/16/2025    Diabetes Care: GFR  02/16/2025    PSA  02/16/2026    DTaP,Tdap,and Td Vaccines (2 - Td or Tdap) 05/04/2026    Meningococcal B Vaccine  Aged Out       No LMP for male patient.   Past Medical History:   History reviewed. No pertinent past medical history.    Past Surgical History:     Past Surgical History:   Procedure Laterality Date    Ankle fracture surgery      Cholecystectomy      Laparoscopic cholecystectomy  2020       Family History:     Family History   Problem Relation Age of Onset    Gastro-Intestinal Disorder Father        Social History:     Social History     Socioeconomic History    Marital status:      Spouse name: Not on file    Number of children: Not on file    Years of education: Not on file    Highest education level: Not on file   Occupational History    Not on file   Tobacco Use    Smoking status: Never    Smokeless  tobacco: Never   Vaping Use    Vaping status: Never Used   Substance and Sexual Activity    Alcohol use: Not Currently     Alcohol/week: 0.0 standard drinks of alcohol    Drug use: Never    Sexual activity: Not on file   Other Topics Concern    Not on file   Social History Narrative    Not on file     Social Drivers of Health     Food Insecurity: Not on file   Transportation Needs: Not on file   Stress: Not on file   Housing Stability: Not on file       Review of Systems:   Review of Systems     Vitals:    02/14/25 0948   BP: 133/82   Pulse: 73   Weight: 241 lb (109.3 kg)   Height: 6' (1.829 m)     Body mass index is 32.69 kg/m².    Physical Exam:   Physical Exam     Assessment and Plan::     Assessment & Plan       Discussed plan of care with pt and pt is in agreement.All questions answered. Pt to call with questions or concerns.         [1]   Allergies  Allergen Reactions    Lisinopril Coughing

## 2025-02-15 NOTE — PROGRESS NOTES
HPI:   Gabriel Stewart is a 59 year old male who presents for an Annual Health Visit.   The patient is doing fine at this time. The patient denies chest pain, SOB, N/V/C/D, fever, dizziness, syncope, and abdominal pain. There are no other concerns today.      Allergies:   Allergies[1]    CURRENT MEDICATIONS   Current Outpatient Medications   Medication Sig Dispense Refill    Tirzepatide (MOUNJARO) 7.5 MG/0.5ML Subcutaneous Solution Auto-injector Inject 7.5 mg into the skin once a week. 2 mL 0    atorvastatin 10 MG Oral Tab Take 1 tablet (10 mg total) by mouth daily. 90 tablet 3    finasteride 5 MG Oral Tab Take 1 tablet (5 mg total) by mouth daily. 90 tablet 3    tamsulosin 0.4 MG Oral Cap Take 1 capsule (0.4 mg total) by mouth daily with dinner. 90 capsule 3      HISTORICAL INFORMATION   History reviewed. No pertinent past medical history.   Past Surgical History:   Procedure Laterality Date    Ankle fracture surgery      Cholecystectomy      Laparoscopic cholecystectomy  2020      Family History   Problem Relation Age of Onset    Gastro-Intestinal Disorder Father       SOCIAL HISTORY   Social History     Socioeconomic History    Marital status:    Tobacco Use    Smoking status: Never    Smokeless tobacco: Never   Vaping Use    Vaping status: Never Used   Substance and Sexual Activity    Alcohol use: Not Currently     Alcohol/week: 0.0 standard drinks of alcohol    Drug use: Never     Social History     Social History Narrative    Not on file        REVIEW OF SYSTEMS:     Review of Systems   Constitutional: Negative.    HENT: Negative.     Eyes: Negative.    Respiratory: Negative.     Cardiovascular: Negative.    Gastrointestinal: Negative.    Genitourinary: Negative.    Musculoskeletal: Negative.    Skin: Negative.    Neurological: Negative.    Psychiatric/Behavioral: Negative.           EXAM:   /80   Pulse 73   Ht 6' (1.829 m)   Wt 241 lb (109.3 kg)   BMI 32.69 kg/m²    Wt Readings from Last 6  Encounters:   02/14/25 241 lb (109.3 kg)   02/16/24 247 lb (112 kg)   03/31/23 265 lb (120.2 kg)   12/27/22 268 lb (121.6 kg)   12/08/22 268 lb (121.6 kg)   08/19/22 282 lb (127.9 kg)     Body mass index is 32.69 kg/m².    Physical Exam  Vitals reviewed.   Constitutional:       Appearance: He is well-developed.   HENT:      Head: Normocephalic and atraumatic.      Right Ear: Tympanic membrane, ear canal and external ear normal. There is no impacted cerumen.      Left Ear: Tympanic membrane, ear canal and external ear normal. There is no impacted cerumen.      Nose: Nose normal.      Mouth/Throat:      Mouth: Mucous membranes are moist.      Pharynx: Oropharynx is clear. No oropharyngeal exudate or posterior oropharyngeal erythema.   Eyes:      General:         Right eye: No discharge.         Left eye: No discharge.      Conjunctiva/sclera: Conjunctivae normal.   Cardiovascular:      Rate and Rhythm: Normal rate and regular rhythm.      Heart sounds: Normal heart sounds.   Pulmonary:      Effort: Pulmonary effort is normal.      Breath sounds: Normal breath sounds.   Abdominal:      General: Abdomen is flat. Bowel sounds are normal. There is no distension.      Palpations: Abdomen is soft.      Tenderness: There is no abdominal tenderness. There is no right CVA tenderness or left CVA tenderness.   Musculoskeletal:         General: Normal range of motion.      Cervical back: Normal range of motion and neck supple.   Skin:     Findings: No rash.   Neurological:      Mental Status: He is alert and oriented to person, place, and time.      Deep Tendon Reflexes: Reflexes are normal and symmetric.   Psychiatric:         Mood and Affect: Mood normal.         Behavior: Behavior normal.         Thought Content: Thought content normal.         Judgment: Judgment normal.          ASSESSMENT AND PLAN:   Gabriel was seen today for routine physical.    Diagnoses and all orders for this visit:    Routine general medical examination  at a health care facility  -     CBC With Differential With Platelet; Future  -     Comp Metabolic Panel (14); Future  -     Hemoglobin A1C; Future  -     Lipid Panel; Future  -     PSA Total, Screen; Future  -     TSH W Reflex To Free T4; Future  -     Microalb/Creat Ratio, Random Urine; Future  Overall health discussed, exercise/activity appropriate for age and health status, heathy diety, preventive care, and upcoming screening discussed. Routine labs ordered.    Need for vaccination  -     Prevnar 20 (PCV20) [82525]    Type 2 diabetes mellitus without complication, without long-term current use of insulin (HCC)  -     Hemoglobin A1C; Future  -     Microalb/Creat Ratio, Random Urine; Future  Bilateral barefoot skin diabetic exam is normal, visualized feet and the appearance is normal.  Bilateral monofilament/sensation of both feet is normal.  Pulsation pedal pulse exam of both lower legs/feet is normal as well.    Diabetes: A1c is 5.7 done 2/16/2024 which shows Excellent control. Continue current meds and lifestyle modification.   DM Meds: Mounjaro Soaj - 7.5 MG/0.5ML      Diabetic Complications:           Mixed hyperlipidemia  -     Lipid Panel; Future  Stable. Continue current management.    Essential hypertension  Stable. Continue current management.    Benign prostatic hyperplasia without lower urinary tract symptoms  -     PSA Total, Screen; Future      There are no Patient Instructions on file for this visit.    The patient indicates understanding of these issues and agrees to the plan.    Problem List:  Patient Active Problem List   Diagnosis    Calculus of gallbladder with acute on chronic cholecystitis with obstruction    Elevated liver enzymes    Enlarged prostate with urinary obstruction    Essential hypertension    Non-recurrent unilateral inguinal hernia without obstruction or gangrene    Umbilical hernia without obstruction and without gangrene    Type 2 diabetes mellitus without complication,  without long-term current use of insulin (HCC)    Mixed hyperlipidemia       Di Drake PA-C  2/14/2025  7:49 PM               [1]   Allergies  Allergen Reactions    Lisinopril Coughing

## 2025-02-15 NOTE — TELEPHONE ENCOUNTER
Impression: Myopia, bilateral: H52.13. Plan: Patient given new CLs today. Pt ed on proper wear and hygiene and on s/s of infection need to rtc asap. released mrx as well. Notified via Mobile Event Guide.  Postponed to confirm patient has reviewed message.

## 2025-02-16 DIAGNOSIS — N40.1 BPH WITH OBSTRUCTION/LOWER URINARY TRACT SYMPTOMS: ICD-10-CM

## 2025-02-16 DIAGNOSIS — R33.9 URINARY RETENTION: ICD-10-CM

## 2025-02-16 DIAGNOSIS — N13.8 BPH WITH OBSTRUCTION/LOWER URINARY TRACT SYMPTOMS: ICD-10-CM

## 2025-02-21 ENCOUNTER — LAB ENCOUNTER (OUTPATIENT)
Dept: LAB | Age: 60
End: 2025-02-21
Attending: PHYSICIAN ASSISTANT
Payer: COMMERCIAL

## 2025-02-21 DIAGNOSIS — E11.9 TYPE 2 DIABETES MELLITUS WITHOUT COMPLICATION, WITHOUT LONG-TERM CURRENT USE OF INSULIN (HCC): ICD-10-CM

## 2025-02-21 DIAGNOSIS — E78.2 MIXED HYPERLIPIDEMIA: ICD-10-CM

## 2025-02-21 DIAGNOSIS — Z00.00 ROUTINE GENERAL MEDICAL EXAMINATION AT A HEALTH CARE FACILITY: ICD-10-CM

## 2025-02-21 DIAGNOSIS — N40.0 BENIGN PROSTATIC HYPERPLASIA WITHOUT LOWER URINARY TRACT SYMPTOMS: ICD-10-CM

## 2025-02-21 LAB
ALBUMIN SERPL-MCNC: 4.4 G/DL (ref 3.2–4.8)
ALBUMIN/GLOB SERPL: 1.7 {RATIO} (ref 1–2)
ALP LIVER SERPL-CCNC: 53 U/L
ALT SERPL-CCNC: 27 U/L
ANION GAP SERPL CALC-SCNC: 8 MMOL/L (ref 0–18)
AST SERPL-CCNC: 19 U/L (ref ?–34)
BASOPHILS # BLD AUTO: 0.05 X10(3) UL (ref 0–0.2)
BASOPHILS NFR BLD AUTO: 0.7 %
BILIRUB SERPL-MCNC: 0.5 MG/DL (ref 0.2–1.1)
BUN BLD-MCNC: 16 MG/DL (ref 9–23)
BUN/CREAT SERPL: 16.2 (ref 10–20)
CALCIUM BLD-MCNC: 9.3 MG/DL (ref 8.7–10.4)
CHLORIDE SERPL-SCNC: 106 MMOL/L (ref 98–112)
CHOLEST SERPL-MCNC: 134 MG/DL (ref ?–200)
CO2 SERPL-SCNC: 26 MMOL/L (ref 21–32)
COMPLEXED PSA SERPL-MCNC: 0.51 NG/ML (ref ?–4)
CREAT BLD-MCNC: 0.99 MG/DL
DEPRECATED RDW RBC AUTO: 42.2 FL (ref 35.1–46.3)
EGFRCR SERPLBLD CKD-EPI 2021: 87 ML/MIN/1.73M2 (ref 60–?)
EOSINOPHIL # BLD AUTO: 0.09 X10(3) UL (ref 0–0.7)
EOSINOPHIL NFR BLD AUTO: 1.3 %
ERYTHROCYTE [DISTWIDTH] IN BLOOD BY AUTOMATED COUNT: 12.4 % (ref 11–15)
EST. AVERAGE GLUCOSE BLD GHB EST-MCNC: 105 MG/DL (ref 68–126)
FASTING PATIENT LIPID ANSWER: YES
FASTING STATUS PATIENT QL REPORTED: YES
GLOBULIN PLAS-MCNC: 2.6 G/DL (ref 2–3.5)
GLUCOSE BLD-MCNC: 102 MG/DL (ref 70–99)
HBA1C MFR BLD: 5.3 % (ref ?–5.7)
HCT VFR BLD AUTO: 49.2 %
HDLC SERPL-MCNC: 44 MG/DL (ref 40–59)
HGB BLD-MCNC: 17.5 G/DL
IMM GRANULOCYTES # BLD AUTO: 0.02 X10(3) UL (ref 0–1)
IMM GRANULOCYTES NFR BLD: 0.3 %
LDLC SERPL CALC-MCNC: 75 MG/DL (ref ?–100)
LYMPHOCYTES # BLD AUTO: 2.3 X10(3) UL (ref 1–4)
LYMPHOCYTES NFR BLD AUTO: 32.3 %
MCH RBC QN AUTO: 32.6 PG (ref 26–34)
MCHC RBC AUTO-ENTMCNC: 35.6 G/DL (ref 31–37)
MCV RBC AUTO: 91.8 FL
MONOCYTES # BLD AUTO: 0.42 X10(3) UL (ref 0.1–1)
MONOCYTES NFR BLD AUTO: 5.9 %
NEUTROPHILS # BLD AUTO: 4.24 X10 (3) UL (ref 1.5–7.7)
NEUTROPHILS # BLD AUTO: 4.24 X10(3) UL (ref 1.5–7.7)
NEUTROPHILS NFR BLD AUTO: 59.5 %
NONHDLC SERPL-MCNC: 90 MG/DL (ref ?–130)
OSMOLALITY SERPL CALC.SUM OF ELEC: 291 MOSM/KG (ref 275–295)
PLATELET # BLD AUTO: 202 10(3)UL (ref 150–450)
POTASSIUM SERPL-SCNC: 4.5 MMOL/L (ref 3.5–5.1)
PROT SERPL-MCNC: 7 G/DL (ref 5.7–8.2)
RBC # BLD AUTO: 5.36 X10(6)UL
SODIUM SERPL-SCNC: 140 MMOL/L (ref 136–145)
TRIGL SERPL-MCNC: 72 MG/DL (ref 30–149)
TSI SER-ACNC: 1.21 UIU/ML (ref 0.55–4.78)
VLDLC SERPL CALC-MCNC: 11 MG/DL (ref 0–30)
WBC # BLD AUTO: 7.1 X10(3) UL (ref 4–11)

## 2025-02-21 PROCEDURE — 80053 COMPREHEN METABOLIC PANEL: CPT

## 2025-02-21 PROCEDURE — 36415 COLL VENOUS BLD VENIPUNCTURE: CPT

## 2025-02-21 PROCEDURE — 80061 LIPID PANEL: CPT

## 2025-02-21 PROCEDURE — 84443 ASSAY THYROID STIM HORMONE: CPT

## 2025-02-21 PROCEDURE — 83036 HEMOGLOBIN GLYCOSYLATED A1C: CPT

## 2025-02-21 PROCEDURE — 85025 COMPLETE CBC W/AUTO DIFF WBC: CPT

## 2025-02-21 RX ORDER — TAMSULOSIN HYDROCHLORIDE 0.4 MG/1
0.4 CAPSULE ORAL
Qty: 90 CAPSULE | Refills: 3 | Status: SHIPPED | OUTPATIENT
Start: 2025-02-21

## 2025-02-21 RX ORDER — FINASTERIDE 5 MG/1
5 TABLET, FILM COATED ORAL DAILY
Qty: 90 TABLET | Refills: 3 | Status: SHIPPED | OUTPATIENT
Start: 2025-02-21

## 2025-02-21 NOTE — TELEPHONE ENCOUNTER
Refill passed per [facility] protocols.    Requested Prescriptions   Pending Prescriptions Disp Refills    finasteride 5 MG Oral Tab [Pharmacy Med Name: FINASTERIDE TABS 5MG] 90 tablet 3     Sig: Take 1 tablet (5 mg total) by mouth daily.       Genitourinary Medications Passed - 2/21/2025 11:29 AM        Passed - Patient does not have pulmonary hypertension on problem list        Passed - In person appointment or virtual visit in the past 12 mos or appointment in next 3 mos        Passed - Medication is active on med list          tamsulosin 0.4 MG Oral Cap [Pharmacy Med Name: TAMSULOSIN HCL CAPS 0.4MG] 90 capsule 3     Sig: Take 1 capsule (0.4 mg total) by mouth daily with dinner.       Genitourinary Medications Passed - 2/21/2025 11:29 AM        Passed - Patient does not have pulmonary hypertension on problem list        Passed - In person appointment or virtual visit in the past 12 mos or appointment in next 3 mos        Passed - Medication is active on med list

## 2025-02-25 ENCOUNTER — TELEPHONE (OUTPATIENT)
Dept: FAMILY MEDICINE CLINIC | Facility: CLINIC | Age: 60
End: 2025-02-25

## 2025-03-03 ENCOUNTER — PATIENT MESSAGE (OUTPATIENT)
Dept: FAMILY MEDICINE CLINIC | Facility: CLINIC | Age: 60
End: 2025-03-03

## 2025-03-05 ENCOUNTER — HOSPITAL ENCOUNTER (OUTPATIENT)
Dept: CV DIAGNOSTICS | Facility: HOSPITAL | Age: 60
Discharge: HOME | End: 2025-03-05
Attending: FAMILY MEDICINE
Payer: COMMERCIAL

## 2025-03-05 DIAGNOSIS — I71.20 THORACIC AORTIC ANEURYSM WITHOUT RUPTURE, UNSPECIFIED PART: ICD-10-CM

## 2025-03-05 PROCEDURE — 93306 TTE W/DOPPLER COMPLETE: CPT | Performed by: FAMILY MEDICINE

## 2025-03-07 ENCOUNTER — PATIENT MESSAGE (OUTPATIENT)
Dept: FAMILY MEDICINE CLINIC | Facility: CLINIC | Age: 60
End: 2025-03-07

## 2025-03-17 ENCOUNTER — HOSPITAL ENCOUNTER (OUTPATIENT)
Dept: CT IMAGING | Age: 60
Discharge: HOME | End: 2025-03-17
Attending: PHYSICIAN ASSISTANT
Payer: COMMERCIAL

## 2025-03-17 DIAGNOSIS — I71.20 THORACIC AORTIC ANEURYSM WITHOUT RUPTURE, UNSPECIFIED PART: ICD-10-CM

## 2025-03-17 PROCEDURE — 71275 CT ANGIOGRAPHY CHEST: CPT | Performed by: PHYSICIAN ASSISTANT

## 2025-04-30 RX ORDER — TIRZEPATIDE 7.5 MG/.5ML
7.5 INJECTION, SOLUTION SUBCUTANEOUS WEEKLY
Qty: 6 ML | Refills: 3 | Status: SHIPPED | OUTPATIENT
Start: 2025-04-30

## 2025-06-02 ENCOUNTER — TELEPHONE (OUTPATIENT)
Dept: FAMILY MEDICINE CLINIC | Facility: CLINIC | Age: 60
End: 2025-06-02

## 2025-06-02 ENCOUNTER — OFFICE VISIT (OUTPATIENT)
Dept: FAMILY MEDICINE CLINIC | Facility: CLINIC | Age: 60
End: 2025-06-02
Payer: COMMERCIAL

## 2025-06-02 VITALS
WEIGHT: 241 LBS | HEART RATE: 56 BPM | BODY MASS INDEX: 32.64 KG/M2 | DIASTOLIC BLOOD PRESSURE: 83 MMHG | HEIGHT: 72 IN | SYSTOLIC BLOOD PRESSURE: 131 MMHG

## 2025-06-02 DIAGNOSIS — E11.9 TYPE 2 DIABETES MELLITUS WITHOUT COMPLICATION, WITHOUT LONG-TERM CURRENT USE OF INSULIN (HCC): ICD-10-CM

## 2025-06-02 DIAGNOSIS — N52.9 ERECTILE DYSFUNCTION, UNSPECIFIED ERECTILE DYSFUNCTION TYPE: ICD-10-CM

## 2025-06-02 DIAGNOSIS — Z00.00 WELLNESS EXAMINATION: Primary | ICD-10-CM

## 2025-06-02 DIAGNOSIS — E78.2 MIXED HYPERLIPIDEMIA: ICD-10-CM

## 2025-06-02 DIAGNOSIS — E66.811 CLASS 1 OBESITY DUE TO EXCESS CALORIES WITH SERIOUS COMORBIDITY AND BODY MASS INDEX (BMI) OF 33.0 TO 33.9 IN ADULT: ICD-10-CM

## 2025-06-02 DIAGNOSIS — E66.09 CLASS 1 OBESITY DUE TO EXCESS CALORIES WITH SERIOUS COMORBIDITY AND BODY MASS INDEX (BMI) OF 33.0 TO 33.9 IN ADULT: ICD-10-CM

## 2025-06-02 LAB
CREAT UR-SCNC: 146.1 MG/DL
HEMOGLOBIN A1C: 5.1 % (ref 4.3–5.6)
MICROALBUMIN UR-MCNC: 0.3 MG/DL
MICROALBUMIN/CREAT 24H UR-RTO: 2.1 UG/MG (ref ?–30)

## 2025-06-02 PROCEDURE — 82570 ASSAY OF URINE CREATININE: CPT | Performed by: PHYSICIAN ASSISTANT

## 2025-06-02 PROCEDURE — 82043 UR ALBUMIN QUANTITATIVE: CPT | Performed by: PHYSICIAN ASSISTANT

## 2025-06-02 RX ORDER — PHENTERMINE HYDROCHLORIDE 15 MG/1
15 CAPSULE ORAL EVERY MORNING
Qty: 90 CAPSULE | Refills: 1 | Status: SHIPPED | OUTPATIENT
Start: 2025-06-02

## 2025-06-02 RX ORDER — VARDENAFIL HYDROCHLORIDE 10 MG/1
10 TABLET ORAL AS NEEDED
Qty: 9 TABLET | Refills: 3 | Status: SHIPPED | OUTPATIENT
Start: 2025-06-02

## 2025-06-02 RX ORDER — ATORVASTATIN CALCIUM 10 MG/1
10 TABLET, FILM COATED ORAL DAILY
Qty: 90 TABLET | Refills: 3 | Status: SHIPPED | OUTPATIENT
Start: 2025-06-02

## 2025-06-02 NOTE — PROGRESS NOTES
HPI:   Gabriel Stewart is a 60 year old male who presents for an Annual Health Visit.   The patient complains of ED.   The patient denies chest pain, SOB, N/V/C/D, fever, dizziness, syncope, and abdominal pain. There are no other concerns today.      Allergies:   Allergies[1]    CURRENT MEDICATIONS   Current Medications[2]   HISTORICAL INFORMATION   Past Medical History[3]   Past Surgical History[4]   Family History[5]   SOCIAL HISTORY   Short Social Hx on File[6]  Social History     Social History Narrative    Not on file        REVIEW OF SYSTEMS:     Review of Systems   Constitutional: Negative.    HENT: Negative.     Eyes: Negative.    Respiratory: Negative.     Cardiovascular: Negative.    Gastrointestinal: Negative.    Genitourinary: Negative.    Musculoskeletal: Negative.    Skin: Negative.    Neurological: Negative.    Psychiatric/Behavioral: Negative.           EXAM:   /83   Pulse 56   Ht 6' (1.829 m)   Wt 241 lb (109.3 kg)   BMI 32.69 kg/m²    Wt Readings from Last 6 Encounters:   06/02/25 241 lb (109.3 kg)   02/14/25 241 lb (109.3 kg)   02/16/24 247 lb (112 kg)   03/31/23 265 lb (120.2 kg)   12/27/22 268 lb (121.6 kg)   12/08/22 268 lb (121.6 kg)     Body mass index is 32.69 kg/m².    Physical Exam  Vitals reviewed.   Constitutional:       Appearance: He is well-developed.   HENT:      Right Ear: Tympanic membrane, ear canal and external ear normal. There is no impacted cerumen.      Left Ear: Tympanic membrane, ear canal and external ear normal. There is no impacted cerumen.      Nose: Nose normal.      Mouth/Throat:      Mouth: Mucous membranes are moist.      Pharynx: Oropharynx is clear. No oropharyngeal exudate or posterior oropharyngeal erythema.   Eyes:      General:         Right eye: No discharge.         Left eye: No discharge.      Conjunctiva/sclera: Conjunctivae normal.   Cardiovascular:      Rate and Rhythm: Normal rate and regular rhythm.      Heart sounds: Normal heart  sounds.   Pulmonary:      Effort: Pulmonary effort is normal.      Breath sounds: Normal breath sounds.   Abdominal:      General: Abdomen is flat. Bowel sounds are normal. There is no distension.      Palpations: Abdomen is soft.      Tenderness: There is no abdominal tenderness. There is no right CVA tenderness or left CVA tenderness.   Musculoskeletal:         General: Normal range of motion.      Cervical back: Normal range of motion and neck supple.   Skin:     Findings: No rash.   Neurological:      Mental Status: He is alert and oriented to person, place, and time.   Psychiatric:         Mood and Affect: Mood normal.         Behavior: Behavior normal.         Thought Content: Thought content normal.         Judgment: Judgment normal.          ASSESSMENT AND PLAN:   Gabriel was seen today for routine physical.    Diagnoses and all orders for this visit:    Wellness examination  Reviewed and discussed lab results with the patient.  Continue current management.    Type 2 diabetes mellitus without complication, without long-term current use of insulin ([facility])  -     POC Glycohemoglobin [20020]  -     Microalb/Creat Ratio, Random Urine; Future  -     Microalb/Creat Ratio, Random Urine  Diabetes: A1c is 5.1 done 6/2/2025 which shows too much glucose control. Adjusting meds and lifestyle as listed.   DM Meds: Mounjaro Soaj - 7.5 MG/0.5ML      Diabetic Complications: CKD 2 (GFR 87).          Mixed hyperlipidemia  -     Comp Metabolic Panel (14); Future  -     Lipid Panel; Future  -     atorvastatin 10 MG Oral Tab; Take 1 tablet (10 mg total) by mouth daily.    Class 1 obesity due to excess calories with serious comorbidity and body mass index (BMI) of 33.0 to 33.9 in adult  -     Phentermine HCl 15 MG Oral Cap; Take 1 capsule (15 mg total) by mouth every morning.    Erectile dysfunction, unspecified erectile dysfunction type  -     Vardenafil HCl (LEVITRA) 10 MG Oral Tab; Take 1 tablet (10 mg total) by mouth as needed for  Erectile Dysfunction.      There are no Patient Instructions on file for this visit.    The patient indicates understanding of these issues and agrees to the plan.    Problem List:  Problem List[7]    Di Drake PA-C  6/2/2025  10:02 AM               [1]   Allergies  Allergen Reactions    Lisinopril Coughing   [2]   Current Outpatient Medications   Medication Sig Dispense Refill    atorvastatin 10 MG Oral Tab Take 1 tablet (10 mg total) by mouth daily. 90 tablet 3    Phentermine HCl 15 MG Oral Cap Take 1 capsule (15 mg total) by mouth every morning. 90 capsule 1    Vardenafil HCl (LEVITRA) 10 MG Oral Tab Take 1 tablet (10 mg total) by mouth as needed for Erectile Dysfunction. 9 tablet 3    Tirzepatide (MOUNJARO) 7.5 MG/0.5ML Subcutaneous Solution Auto-injector Inject 7.5 mg into the skin once a week. 6 mL 3    finasteride 5 MG Oral Tab Take 1 tablet (5 mg total) by mouth daily. 90 tablet 3    tamsulosin 0.4 MG Oral Cap Take 1 capsule (0.4 mg total) by mouth daily with dinner. 90 capsule 3   [3] History reviewed. No pertinent past medical history.  [4]   Past Surgical History:  Procedure Laterality Date    Ankle fracture surgery      Cholecystectomy      Laparoscopic cholecystectomy  2020   [5]   Family History  Problem Relation Age of Onset    Gastro-Intestinal Disorder Father    [6]   Social History  Socioeconomic History    Marital status:    Tobacco Use    Smoking status: Never    Smokeless tobacco: Never   Vaping Use    Vaping status: Never Used   Substance and Sexual Activity    Alcohol use: Not Currently     Alcohol/week: 0.0 standard drinks of alcohol    Drug use: Never   [7]   Patient Active Problem List  Diagnosis    Calculus of gallbladder with acute on chronic cholecystitis with obstruction    Elevated liver enzymes    Enlarged prostate with urinary obstruction    Essential hypertension    Non-recurrent unilateral inguinal hernia without obstruction or gangrene    Umbilical hernia without  obstruction and without gangrene    Type 2 diabetes mellitus without complication, without long-term current use of insulin (HCC)    Mixed hyperlipidemia

## 2025-06-03 ENCOUNTER — TELEPHONE (OUTPATIENT)
Dept: FAMILY MEDICINE CLINIC | Facility: CLINIC | Age: 60
End: 2025-06-03

## 2025-06-03 ENCOUNTER — PATIENT MESSAGE (OUTPATIENT)
Dept: FAMILY MEDICINE CLINIC | Facility: CLINIC | Age: 60
End: 2025-06-03

## 2025-06-03 NOTE — TELEPHONE ENCOUNTER
Prior authorization for vardenafil was done through [facility] [employer] scripts. It can take up to 14 business days for a decision to come back

## 2025-06-03 NOTE — TELEPHONE ENCOUNTER
Prior authorization for phentermine has been approved.    Approved    Prior authorization approved  Payer: [employer] Case ID: [id number]    [phone]    [phone]  Note from payer: CaseId:[id number];Status:Approved;Review Type:Prior Auth;Coverage Start Date:05/04/2025;Coverage End Date:09/01/2025;  Approval Details    Authorized from May 4, 2025 to September 1, 2025

## 2025-06-03 NOTE — TELEPHONE ENCOUNTER
Approved    Prior authorization approved  Payer: [employer] Case ID: [id number]    [phone]    [phone]  Note from payer: CaseId:[id number];Status:Approved;Review Type:Prior Auth;Coverage Start Date:05/03/2025;Coverage End Date:06/02/2026;;CaseId:[id number];Status:Denied;Review Type:Qty;Appeal Information:;  Approval Details    Authorized from May 3, 2025 to June 2, 2026  Electronic appeal: Not supported

## 2025-06-03 NOTE — TELEPHONE ENCOUNTER
Denial letter received stating Vardenafil covered for BPH, ED after radical prostatectomy or Raynaud's phenomenon.

## 2025-06-04 ENCOUNTER — PATIENT MESSAGE (OUTPATIENT)
Dept: FAMILY MEDICINE CLINIC | Facility: CLINIC | Age: 60
End: 2025-06-04

## 2025-06-09 ENCOUNTER — TELEPHONE (OUTPATIENT)
Dept: FAMILY MEDICINE CLINIC | Facility: CLINIC | Age: 60
End: 2025-06-09

## 2025-06-09 NOTE — TELEPHONE ENCOUNTER
Nabor COHN - please clarify maximum daily dose for vardenafil    Call received from sunil Grace to [place].     Requesting clarification on prescription sent 6/5/25 for:    Vardenafil HCl (LEVITRA) 10 MG Oral Tab   Take 1 tablet (10 mg total) by mouth as needed for Erectile Dysfunction. Dispense: 9 tablet, Refills: 3 ordered     Pharmacy is requesting maximum daily dose be specified before filling this mediacation.

## 2025-06-27 ENCOUNTER — OFFICE VISIT (OUTPATIENT)
Facility: CLINIC | Age: 60
End: 2025-06-27
Payer: COMMERCIAL

## 2025-06-27 ENCOUNTER — TELEPHONE (OUTPATIENT)
Facility: CLINIC | Age: 60
End: 2025-06-27

## 2025-06-27 VITALS
BODY MASS INDEX: 32.91 KG/M2 | WEIGHT: 243 LBS | HEART RATE: 71 BPM | SYSTOLIC BLOOD PRESSURE: 115 MMHG | DIASTOLIC BLOOD PRESSURE: 74 MMHG | HEIGHT: 72 IN

## 2025-06-27 DIAGNOSIS — Z12.11 SCREENING FOR COLON CANCER: Primary | ICD-10-CM

## 2025-06-27 DIAGNOSIS — Z12.11 COLON CANCER SCREENING: Primary | ICD-10-CM

## 2025-06-27 PROCEDURE — 3008F BODY MASS INDEX DOCD: CPT | Performed by: NURSE PRACTITIONER

## 2025-06-27 PROCEDURE — 3074F SYST BP LT 130 MM HG: CPT | Performed by: NURSE PRACTITIONER

## 2025-06-27 PROCEDURE — 3078F DIAST BP <80 MM HG: CPT | Performed by: NURSE PRACTITIONER

## 2025-06-27 PROCEDURE — S0285 CNSLT BEFORE SCREEN COLONOSC: HCPCS | Performed by: NURSE PRACTITIONER

## 2025-06-27 NOTE — PATIENT INSTRUCTIONS
1. Schedule colonoscopy with General Pool Endoscopist  Diagnosis: colon cancer screening   Sedation: MAC  Prep: split dose golytely      2. MEDICATION CHANGES PRIOR TO PROCEDURE    Please read the Bowel Preparation Instruction Sheet carefully, as it contains important information regarding your medications before the procedure. Many medications will need to be temporarily stopped to ensure your safety and the success of the procedure.   Commonly stopped medications include:    Diabetic medications (such as: metformin, glimepiride, sitagliptin (januvia), empagliflozin (jardiance), GLP-1 (semaglutide/ozempic/wegovy), insulin)    Weight loss medications (such as: phentermine, semaglutide/wegovy/ozempic, or tirzepatide/zepbound/mounjaro, contrave)    Anticoagulants or antiplatelet medications \"blood thinner\" (such as apixaban (Eliquis), warfarin (Coumadin), clopidogrel (Plavix), rivaroxaban (Xarelto))    Certain hypertension medications \"Ace inhibitors or ARBs\" (such as lisinopril, enalapril, losartan, valsartan, olmesartan)    Medications to treat erectile dysfunction, benign prostatic hyperplasia or pulmonary hypertension (such as viagra/sildenafil, Tadalafil (Cialis)    Additionally, vitamins and supplements should be stopped for 2 weeks prior to the procedure.    Failure to correctly stop these medications as instructed can result in cancellation of the procedure for your safety. It is crucial to follow these instructions closely. If you have any questions or concerns regarding your medications, please contact your healthcare provider before the procedure date.      please read bowel preparation handout for complete list of medication adjustments*  14 Days Before Procedure   STOP all vitamins and supplements, including: Iron, multivitamins, vitamin E, herbal/mineral supplements, and over-the-counter diet medications    7 Days Before Procedure  STOP prasugrel (Effient) - antiplatelet  STOP Ozempic, semaglutide,  trulicity, mounjaro, wegovy  STOP any weight less medication    5 Days Before Procedure   STOP any of the following:  - Warfarin (Coumadin)  - Ticagrelor (Brilinta)  - Clopidogrel (Plavix)  - Acetylsalicylic acid / Dipyridamole (Aggrenox)    4 Days Before Procedure   STOP any of the following:  - Jardiance  - Dapagliflozin (Farxiga)  - Canagliflozin (Invokana)    3 Days Before Procedure   STOP any erectile dysfunction medications (e.g., Viagra, Cialis, Levitra, Slendra)    2 Days (48 Hours) Before Procedure   STOP any of the following:  - Rivaroxaban (Xarelto)  - Apixaban (Eliquis)  - Edoxaban (Savaysa)  - Dabigatran (Pradaxa)    1 Day Before & Day of Procedure   STOP Metformin   STOP Glimepiride, glipizide, pioglitazone   STOP Januvia, Tradjenta, Janumet    Day of Procedure   STOP all ADHD medications   STOP all diuretics    24 Hours Before Procedure   DO NOT CONSUME any alcohol or use recreational drugs       3.  bowel prep from pharmacy   You can pick the bowel prep up now and store in a cool, dry place in your home until your scheduled bowel prep start date.    4. Read all bowel prep instructions carefully. Bowel prep instructions can also be found online at:  [email or web address]     5. AVOID seeds, nuts, popcorn, raw fruits and vegetables for 5 days before procedure    6. If you start any NEW medication after your visit today, please notify us. Certain medications will need to be stopped before the procedure, or the procedure cannot be performed safely.

## 2025-06-27 NOTE — TELEPHONE ENCOUNTER
Patient was seen in office today (6/27/2025) by OSCAR Renteria. Provided patient with office number and prep instructions. Reviewed prep instructions with patient in office, verbalized understanding. Patient aware GI schedulers will call patient to schedule the procedure.      Procedure orders:      Instructions    1. Schedule colonoscopy with General Pool Endoscopist  Diagnosis: colon cancer screening   Sedation: MAC  Prep: split dose golytely        2. MEDICATION CHANGES PRIOR TO PROCEDURE    Please read the Bowel Preparation Instruction Sheet carefully, as it contains important information regarding your medications before the procedure. Many medications will need to be temporarily stopped to ensure your safety and the success of the procedure.   Commonly stopped medications include:    Diabetic medications (such as: metformin, glimepiride, sitagliptin (januvia), empagliflozin (jardiance), GLP-1 (semaglutide/ozempic/wegovy), insulin)    Weight loss medications (such as: phentermine, semaglutide/wegovy/ozempic, or tirzepatide/zepbound/mounjaro, contrave)    Anticoagulants or antiplatelet medications \"blood thinner\" (such as apixaban (Eliquis), warfarin (Coumadin), clopidogrel (Plavix), rivaroxaban (Xarelto))    Certain hypertension medications \"Ace inhibitors or ARBs\" (such as lisinopril, enalapril, losartan, valsartan, olmesartan)     Medications to treat erectile dysfunction, benign prostatic hyperplasia or pulmonary hypertension (such as viagra/sildenafil, Tadalafil (Cialis)    Additionally, vitamins and supplements should be stopped for 2 weeks prior to the procedure.    Failure to correctly stop these medications as instructed can result in cancellation of the procedure for your safety. It is crucial to follow these instructions closely. If you have any questions or concerns regarding your medications, please contact your healthcare provider before the procedure date.       please read bowel preparation  handout for complete list of medication adjustments*  14 Days Before Procedure   STOP all vitamins and supplements, including: Iron, multivitamins, vitamin E, herbal/mineral supplements, and over-the-counter diet medications    7 Days Before Procedure  STOP prasugrel (Effient) - antiplatelet  STOP Ozempic, semaglutide, trulicity, mounjaro, wegovy  STOP any weight less medication    5 Days Before Procedure   STOP any of the following:  - Warfarin (Coumadin)  - Ticagrelor (Brilinta)  - Clopidogrel (Plavix)  - Acetylsalicylic acid / Dipyridamole (Aggrenox)    4 Days Before Procedure   STOP any of the following:  - Jardiance  - Dapagliflozin (Farxiga)  - Canagliflozin (Invokana)    3 Days Before Procedure   STOP any erectile dysfunction medications (e.g., Viagra, Cialis, Levitra, Slendra)    2 Days (48 Hours) Before Procedure   STOP any of the following:  - Rivaroxaban (Xarelto)  - Apixaban (Eliquis)  - Edoxaban (Savaysa)  - Dabigatran (Pradaxa)    1 Day Before & Day of Procedure   STOP Metformin   STOP Glimepiride, glipizide, pioglitazone   STOP Januvia, Tradjenta, Janumet    Day of Procedure   STOP all ADHD medications   STOP all diuretics    24 Hours Before Procedure   DO NOT CONSUME any alcohol or use recreational drugs         3.  bowel prep from pharmacy   You can pick the bowel prep up now and store in a cool, dry place in your home until your scheduled bowel prep start date.     4. Read all bowel prep instructions carefully. Bowel prep instructions can also be found online at:  [email or web address]      5. AVOID seeds, nuts, popcorn, raw fruits and vegetables for 5 days before procedure     6. If you start any NEW medication after your visit today, please notify us. Certain medications will need to be stopped before the procedure, or the procedure cannot be performed safely.

## 2025-06-27 NOTE — TELEPHONE ENCOUNTER
Scheduled for:  Colonoscopy 07000   Provider Name:  Dr. Red  Date:  10/14/2025  Location:    [facility]  Sedation:  MAC  Time:  2:35 (pt is aware that ENDO will call the day before to confirm arrival time)  Prep:  Golytely   Meds/Allergies Reconciled?:  Physician reviewed   Diagnosis with codes:  Colon cancer screening Z12.11  Was patient informed to call insurance with codes (Y/N):  Yes, I confirmed [employer] insurance with the patient.   Referral sent?:  Referral was sent at the time of electronic surgical scheduling.  [facility] or [facility] notified?:  I sent an electronic request to Endo Scheduling and received a confirmation today.   Medication Orders:  This patient verbally confirmed that he is not taking:   Iron, blood thinners, BP meds, and is not diabetic   Not latex allergy, Not PCN allergy and does not have a pacemaker  Misc Orders:     I discussed the prep instructions with the patient which he verbally understood and is aware that I will mychart the instructions today.    Further instructions given by staff:       DO NOT TAKE PHENTERMINE OR MOUNJARO FOR 7 FULL DAYS BEFORE THE PROCEDURE

## 2025-06-27 NOTE — H&P
[facility] - Gastroenterology                                                                                                  Clinic History and Physical     Chief Complaint   Patient presents with    Consult     For colon screening       Requesting physician or provider: Jeremias Mata DO    HPI:   Gabriel Stewart is a 60 year old year-old male with history of diabetes, hypertension, hyperlipidemia, BPH. Surgical history cholecystectomy. The patient presents for colon cancer screening evaluation.    Patient denies any GI symptoms of nausea, vomiting, heartburn, dyspepsia, dysphagia, hematemesis, abdominal pain, change in bowel habits, constipation, diarrhea, hematochezia, or melena.  Additionally there is no unintentional weight loss.      PRIOR GI WORK UP:   No prior CLN      NSAIDS: none  Tobacco: none  Alcohol: none  Marijuana: none  Illicit drugs: none    FH GI malignancy:  none    No history of adverse reaction to sedation  No KENYA  No anticoagulants/antiplatelet  No pacemaker/defibrillator  No pain medications and/or sleep aides    Wt Readings from Last 6 Encounters:   06/27/25 243 lb (110.2 kg)   06/02/25 241 lb (109.3 kg)   02/14/25 241 lb (109.3 kg)   02/16/24 247 lb (112 kg)   03/31/23 265 lb (120.2 kg)   12/27/22 268 lb (121.6 kg)          History, Medications, Allergies, ROS:      Past Medical History[1]   Past Surgical History[2]   Family Hx: Family History[3]   Social History: Short Social Hx on File[4]     Medications (Active prior to today's visit):  Current Medications[5]    Allergies:  Allergies[6]    ROS:   CONSTITUTIONAL: negative for fevers, chills, sweats  EYES Negative for scleral icterus or redness, and diplopia  HEENT: Negative for hoarseness  RESPIRATORY: Negative for cough and severe shortness of breath  CARDIOVASCULAR: Negative for crushing sub-sternal chest pain  GASTROINTESTINAL: See HPI  GENITOURINARY: Negative for dysuria  MUSCULOSKELETAL: Negative for arthralgias and  myalgias  SKIN: Negative for jaundice, rash or pruritus  NEUROLOGICAL: Negative for dizziness and headaches  BEHAVIOR/PSYCH: Negative for psychotic behavior      PHYSICAL EXAM:   Blood pressure 115/74, pulse 71, height 6' (1.829 m), weight 243 lb (110.2 kg).    GEN: Alert, no acute distress, well-nourished   HEENT: anicteric sclera, neck supple, trachea midline, MMM, no palpable or tender neck or supraclavicular lymph nodes  CV: RRR, the extremities are warm and well perfused   LUNGS: No increased work of breathing, CTAB  ABDOMEN: Soft, symmetrical, non-tender without distention or guarding. No scars or lesions. Aorta is without bruit or visible pulsation. Umbilicus is midline without herniation. Normoactive bowel sounds are present, No masses, hepatomegaly or splenomegaly noted.  MSK: No erythema, no warmth, no swelling of joints  SKIN: No jaundice, no erythema, no rashes, no lesions  HEMATOLOGIC: No bleeding, no bruising  NEURO: Alert and interactive, MURRAY  PSYCH: appropriate mood & affect    Labs/Imaging:     Patient's labs and imaging were reviewed and discussed with patient today.    .  ASSESSMENT/PLAN:   Gabriel Stewart is a 60 year old year-old male with history of diabetes, hypertension, hyperlipidemia, BPH. Surgical history cholecystectomy. The patient presents for colon cancer screening evaluation.    #colorectal cancer screening: patient is considered average risk for colon cancer (No immediate family hx of colon cancer) and it is appropriate to proceed with screening colonoscopy. Patient is currently asymptomatic and denies diarrhea, hematochezia, thin-stools or weight loss. We discussed risks/benefits/alternatives to procedure, including stool testing, they want to proceed with colonoscopy.  No anemia noted on blood work.        Patient Instructions   1. Schedule colonoscopy with General Pool Endoscopist  Diagnosis: colon cancer screening   Sedation: MAC  Prep: split dose golytely      2. MEDICATION  CHANGES PRIOR TO PROCEDURE    Please read the Bowel Preparation Instruction Sheet carefully, as it contains important information regarding your medications before the procedure. Many medications will need to be temporarily stopped to ensure your safety and the success of the procedure.   Commonly stopped medications include:    Diabetic medications (such as: metformin, glimepiride, sitagliptin (januvia), empagliflozin (jardiance), GLP-1 (semaglutide/ozempic/wegovy), insulin)    Weight loss medications (such as: phentermine, semaglutide/wegovy/ozempic, or tirzepatide/zepbound/mounjaro, contrave)    Anticoagulants or antiplatelet medications \"blood thinner\" (such as apixaban (Eliquis), warfarin (Coumadin), clopidogrel (Plavix), rivaroxaban (Xarelto))    Certain hypertension medications \"Ace inhibitors or ARBs\" (such as lisinopril, enalapril, losartan, valsartan, olmesartan)    Medications to treat erectile dysfunction, benign prostatic hyperplasia or pulmonary hypertension (such as viagra/sildenafil, Tadalafil (Cialis)    Additionally, vitamins and supplements should be stopped for 2 weeks prior to the procedure.    Failure to correctly stop these medications as instructed can result in cancellation of the procedure for your safety. It is crucial to follow these instructions closely. If you have any questions or concerns regarding your medications, please contact your healthcare provider before the procedure date.      please read bowel preparation handout for complete list of medication adjustments*  14 Days Before Procedure   STOP all vitamins and supplements, including: Iron, multivitamins, vitamin E, herbal/mineral supplements, and over-the-counter diet medications    7 Days Before Procedure  STOP prasugrel (Effient) - antiplatelet  STOP Ozempic, semaglutide, trulicity, mounjaro, wegovy  STOP any weight less medication    5 Days Before Procedure   STOP any of the following:  - Warfarin (Coumadin)  - Ticagrelor  (Brilinta)  - Clopidogrel (Plavix)  - Acetylsalicylic acid / Dipyridamole (Aggrenox)    4 Days Before Procedure   STOP any of the following:  - Jardiance  - Dapagliflozin (Farxiga)  - Canagliflozin (Invokana)    3 Days Before Procedure   STOP any erectile dysfunction medications (e.g., Viagra, Cialis, Levitra, Slendra)    2 Days (48 Hours) Before Procedure   STOP any of the following:  - Rivaroxaban (Xarelto)  - Apixaban (Eliquis)  - Edoxaban (Savaysa)  - Dabigatran (Pradaxa)    1 Day Before & Day of Procedure   STOP Metformin   STOP Glimepiride, glipizide, pioglitazone   STOP Januvia, Tradjenta, Janumet    Day of Procedure   STOP all ADHD medications   STOP all diuretics    24 Hours Before Procedure   DO NOT CONSUME any alcohol or use recreational drugs       3.  bowel prep from pharmacy   You can pick the bowel prep up now and store in a cool, dry place in your home until your scheduled bowel prep start date.    4. Read all bowel prep instructions carefully. Bowel prep instructions can also be found online at:  www.[facility].org/giprep     5. AVOID seeds, nuts, popcorn, raw fruits and vegetables for 5 days before procedure    6. If you start any NEW medication after your visit today, please notify us. Certain medications will need to be stopped before the procedure, or the procedure cannot be performed safely.       Endoscopy risk/benefit discussion: I have thoroughly discussed the risks, benefits, and alternatives of endoscopic evaluation with the patient, who demonstrated understanding. This includes the potential risks of bleeding, infection, pain, anesthesia complications, and perforation, which may result in prolonged hospitalization or surgical intervention. All of the patient’s questions were addressed to their satisfaction. The patient has chosen to proceed with the endoscopic procedure, including any necessary interventions such as polypectomy, biopsy, control of bleeding.      Orders This  Visit:  No orders of the defined types were placed in this encounter.      Meds This Visit:  Requested Prescriptions     Signed Prescriptions Disp Refills    polyethylene glycol, PEG 3350-KCl-NaBcb-NaCl-NaSulf, 236 g Oral Recon Soln 1 each 0     Sig: Take 4,000 mL by mouth As Directed. Take 2,000 mL the night before your procedure and 2,000 mL the morning of your procedure.       Imaging & Referrals:  None       OSCAR Andrade    [facility] Gastroenterology  6/27/2025               [1]   Past Medical History:   Diabetes (HCC)   [2]   Past Surgical History:  Procedure Laterality Date    Ankle fracture surgery      Cholecystectomy      Laparoscopic cholecystectomy  2020    Other surgical history  12/86    Major reconstruction of the lateral ankle   [3]   Family History  Problem Relation Age of Onset    Gastro-Intestinal Disorder Father     Diabetes Father     Cancer Maternal Grandmother    [4]   Social History  Socioeconomic History    Marital status:    Tobacco Use    Smoking status: Never    Smokeless tobacco: Never   Vaping Use    Vaping status: Never Used   Substance and Sexual Activity    Alcohol use: Yes     Alcohol/week: 4.0 standard drinks of alcohol     Types: 2 Glasses of wine, 2 Cans of beer per week    Drug use: Never   [5]   Current Outpatient Medications   Medication Sig Dispense Refill    polyethylene glycol, PEG 3350-KCl-NaBcb-NaCl-NaSulf, 236 g Oral Recon Soln Take 4,000 mL by mouth As Directed. Take 2,000 mL the night before your procedure and 2,000 mL the morning of your procedure. 1 each 0    atorvastatin 10 MG Oral Tab Take 1 tablet (10 mg total) by mouth daily. 90 tablet 3    Phentermine HCl 15 MG Oral Cap Take 1 capsule (15 mg total) by mouth every morning. 90 capsule 1    Vardenafil HCl (LEVITRA) 10 MG Oral Tab Take 1 tablet (10 mg total) by mouth as needed for Erectile Dysfunction. 9 tablet 3    Tirzepatide (MOUNJARO) 7.5 MG/0.5ML Subcutaneous Solution Auto-injector Inject  7.5 mg into the skin once a week. 6 mL 3    finasteride 5 MG Oral Tab Take 1 tablet (5 mg total) by mouth daily. 90 tablet 3    tamsulosin 0.4 MG Oral Cap Take 1 capsule (0.4 mg total) by mouth daily with dinner. 90 capsule 3   [6]   Allergies  Allergen Reactions    Lisinopril Coughing

## 2025-07-08 ENCOUNTER — TELEPHONE (OUTPATIENT)
Dept: FAMILY MEDICINE CLINIC | Facility: CLINIC | Age: 60
End: 2025-07-08

## 2025-07-08 NOTE — TELEPHONE ENCOUNTER
Fanny from [facility] called regarding renewal on Mounjaro prior authorization.    Answered clinical questions. Received approval over the phone. Approval letter will be sent.    Coverage dates:   06- to 07-     Case ID #[id number]

## 2025-07-14 ENCOUNTER — PATIENT MESSAGE (OUTPATIENT)
Dept: FAMILY MEDICINE CLINIC | Facility: CLINIC | Age: 60
End: 2025-07-14

## 2025-07-15 NOTE — TELEPHONE ENCOUNTER
[employer] message sent to pt.     Future Appointments   Date Time Provider Department Center   10/14/2025  2:35 PM [facility], PROCEDURE [id number] None

## 2025-07-16 ENCOUNTER — TELEPHONE (OUTPATIENT)
Dept: FAMILY MEDICINE CLINIC | Facility: CLINIC | Age: 60
End: 2025-07-16

## 2025-07-16 DIAGNOSIS — E66.09 CLASS 1 OBESITY DUE TO EXCESS CALORIES WITH SERIOUS COMORBIDITY AND BODY MASS INDEX (BMI) OF 33.0 TO 33.9 IN ADULT: Primary | ICD-10-CM

## 2025-07-16 DIAGNOSIS — E66.811 CLASS 1 OBESITY DUE TO EXCESS CALORIES WITH SERIOUS COMORBIDITY AND BODY MASS INDEX (BMI) OF 33.0 TO 33.9 IN ADULT: Primary | ICD-10-CM

## 2025-07-16 RX ORDER — PHENTERMINE HYDROCHLORIDE 37.5 MG/1
37.5 TABLET ORAL
Qty: 30 TABLET | Refills: 3 | Status: SHIPPED | OUTPATIENT
Start: 2025-07-16

## 2025-07-16 NOTE — TELEPHONE ENCOUNTER
Left detailed message  ( per Release of Information ) that RX for Phentermine was sent to [facility]

## 2025-07-16 NOTE — TELEPHONE ENCOUNTER
Patient stated that he has been on the phentermine 15mg daily but has not been doing anything for his weight. States he is frustrated since his weight is still at 239 pounds. Stated that has changed his diet and exercising and doing everything that she recommended. Stated that he was on phentermine 37.5mg in the past, which he saw more results with that dosage. Wanted to know if can go back to the 37.5mg dosage? Please advise.

## 2025-07-17 ENCOUNTER — TELEPHONE (OUTPATIENT)
Dept: FAMILY MEDICINE CLINIC | Facility: CLINIC | Age: 60
End: 2025-07-17

## 2025-07-17 NOTE — TELEPHONE ENCOUNTER
Approved    Prior authorization approved  Payer: [employer] Case ID: [id number]    [phone]    [phone]  Note from payer: CaseId:[id number];Status:Approved;Review Type:Prior Auth;Coverage Start Date:06/17/2025;Coverage End Date:10/15/2025;  Approval Details    Authorized from June 17, 2025 to October 15, 2025  Electronic appeal: Not supported  View History

## 2025-07-29 ENCOUNTER — PATIENT MESSAGE (OUTPATIENT)
Dept: FAMILY MEDICINE CLINIC | Facility: CLINIC | Age: 60
End: 2025-07-29

## 2025-07-29 DIAGNOSIS — L98.9 SKIN LESIONS: Primary | ICD-10-CM

## 2025-08-07 ENCOUNTER — TELEPHONE (OUTPATIENT)
Dept: FAMILY MEDICINE CLINIC | Facility: CLINIC | Age: 60
End: 2025-08-07

## 2025-08-07 DIAGNOSIS — E66.09 CLASS 1 OBESITY DUE TO EXCESS CALORIES WITH SERIOUS COMORBIDITY AND BODY MASS INDEX (BMI) OF 33.0 TO 33.9 IN ADULT: ICD-10-CM

## 2025-08-07 DIAGNOSIS — E66.811 CLASS 1 OBESITY DUE TO EXCESS CALORIES WITH SERIOUS COMORBIDITY AND BODY MASS INDEX (BMI) OF 33.0 TO 33.9 IN ADULT: ICD-10-CM

## 2025-08-07 RX ORDER — PHENTERMINE HYDROCHLORIDE 37.5 MG/1
37.5 TABLET ORAL
Qty: 30 TABLET | Refills: 0 | Status: SHIPPED | OUTPATIENT
Start: 2025-08-07

## 2025-08-18 ENCOUNTER — OFFICE VISIT (OUTPATIENT)
Dept: DERMATOLOGY CLINIC | Facility: CLINIC | Age: 60
End: 2025-08-18

## 2025-08-18 DIAGNOSIS — D49.2 NEOPLASM OF UNSPECIFIED BEHAVIOR OF BONE, SOFT TISSUE, AND SKIN: ICD-10-CM

## 2025-08-18 DIAGNOSIS — L82.1 SEBORRHEIC KERATOSES: Primary | ICD-10-CM

## 2025-08-18 PROCEDURE — 88305 TISSUE EXAM BY PATHOLOGIST: CPT | Performed by: STUDENT IN AN ORGANIZED HEALTH CARE EDUCATION/TRAINING PROGRAM

## 2025-08-26 DIAGNOSIS — E66.09 CLASS 1 OBESITY DUE TO EXCESS CALORIES WITH SERIOUS COMORBIDITY AND BODY MASS INDEX (BMI) OF 33.0 TO 33.9 IN ADULT: ICD-10-CM

## 2025-08-26 DIAGNOSIS — E66.811 CLASS 1 OBESITY DUE TO EXCESS CALORIES WITH SERIOUS COMORBIDITY AND BODY MASS INDEX (BMI) OF 33.0 TO 33.9 IN ADULT: ICD-10-CM

## 2025-08-29 RX ORDER — PHENTERMINE HYDROCHLORIDE 37.5 MG/1
37.5 TABLET ORAL
Qty: 30 TABLET | Refills: 3 | Status: SHIPPED | OUTPATIENT
Start: 2025-08-29

## (undated) DEVICE — 12 ML SYRINGE LUER-LOCK TIP: Brand: MONOJECT

## (undated) DEVICE — [HIGH FLOW INSUFFLATOR,  DO NOT USE IF PACKAGE IS DAMAGED,  KEEP DRY,  KEEP AWAY FROM SUNLIGHT,  PROTECT FROM HEAT AND RADIOACTIVE SOURCES.]: Brand: PNEUMOSURE

## (undated) DEVICE — TISSUE RETRIEVAL SYSTEM: Brand: INZII RETRIEVAL SYSTEM

## (undated) DEVICE — TROCAR: Brand: KII FIOS FIRST ENTRY

## (undated) DEVICE — NEEDLE HPO 18GA 1.5IN ECLPS

## (undated) DEVICE — SOL  .9 3000ML

## (undated) DEVICE — TROCAR: Brand: KII® SLEEVE

## (undated) DEVICE — DERMABOND LIQUID ADHESIVE

## (undated) DEVICE — LAP CHOLE: Brand: MEDLINE INDUSTRIES, INC.

## (undated) DEVICE — ENCORE® LATEX ACCLAIM SIZE 8, STERILE LATEX POWDER-FREE SURGICAL GLOVE: Brand: ENCORE

## (undated) DEVICE — SOL  .9 1000ML BTL

## (undated) DEVICE — ENCORE® LATEX MICRO SIZE 8, STERILE LATEX POWDER-FREE SURGICAL GLOVE: Brand: ENCORE

## (undated) DEVICE — OPEN-END URETERAL CATHETER SOF-FLEX: Brand: SOF-FLEX

## (undated) DEVICE — UNDYED BRAIDED (POLYGLACTIN 910), SYNTHETIC ABSORBABLE SUTURE: Brand: COATED VICRYL

## (undated) DEVICE — SUTURE VICRYL 0 UR-6

## (undated) DEVICE — LIGAMAX 5 MM ENDOSCOPIC MULTIPLE CLIP APPLIER: Brand: LIGAMAX

## (undated) DEVICE — CULTURE COLLECT/TRANSPORT SYS

## (undated) NOTE — LETTER
06/14/22        [address] [place] [address]      Dear Kory Located within [facility] records indicate that you have outstanding lab work and or testing that was ordered for you and has not yet been completed:  Orders Placed This Encounter      Occult Blood, Fecal, Immunoassay (Blue cards) [E]    To provide you with the best possible care, please complete these orders at your earliest convenience. If you have recently completed these orders please disregard this letter. If you have any questions please call the office at Dept: [phone].      Thank you,       Brittany Nathan PA-C

## (undated) NOTE — LETTER
09/14/22        [address] [place] [id number]      Dear Libby Gutierres records indicate that you have outstanding lab work and or testing that was ordered for you and has not yet been completed:  Orders Placed This Encounter      Occult Blood, Fecal, Immunoassay (Blue cards) [E]    To provide you with the best possible care, please complete these orders at your earliest convenience. If you have recently completed these orders please disregard this letter. If you have any questions please call the office at Dept: [phone].      Thank you,       Sara Penaloza PA-C